# Patient Record
Sex: FEMALE | Race: WHITE | Employment: OTHER | URBAN - METROPOLITAN AREA
[De-identification: names, ages, dates, MRNs, and addresses within clinical notes are randomized per-mention and may not be internally consistent; named-entity substitution may affect disease eponyms.]

---

## 2017-03-06 ENCOUNTER — OFFICE VISIT (OUTPATIENT)
Dept: ORTHOPEDIC SURGERY | Age: 62
End: 2017-03-06

## 2017-03-06 ENCOUNTER — TELEPHONE (OUTPATIENT)
Dept: ORTHOPEDIC SURGERY | Age: 62
End: 2017-03-06

## 2017-03-06 VITALS
SYSTOLIC BLOOD PRESSURE: 120 MMHG | HEART RATE: 88 BPM | BODY MASS INDEX: 29.03 KG/M2 | WEIGHT: 184.97 LBS | DIASTOLIC BLOOD PRESSURE: 84 MMHG | HEIGHT: 67 IN

## 2017-03-06 DIAGNOSIS — M25.561 CHRONIC PAIN OF BOTH KNEES: ICD-10-CM

## 2017-03-06 DIAGNOSIS — G89.29 CHRONIC PAIN OF BOTH KNEES: ICD-10-CM

## 2017-03-06 DIAGNOSIS — M22.41 CHONDROMALACIA OF BOTH PATELLAE: ICD-10-CM

## 2017-03-06 DIAGNOSIS — M25.562 CHRONIC PAIN OF BOTH KNEES: ICD-10-CM

## 2017-03-06 DIAGNOSIS — M17.0 PRIMARY OSTEOARTHRITIS OF BOTH KNEES: Primary | ICD-10-CM

## 2017-03-06 DIAGNOSIS — M22.42 CHONDROMALACIA OF BOTH PATELLAE: ICD-10-CM

## 2017-03-06 PROCEDURE — 99214 OFFICE O/P EST MOD 30 MIN: CPT | Performed by: FAMILY MEDICINE

## 2017-03-06 RX ORDER — FLUOXETINE HYDROCHLORIDE 40 MG/1
CAPSULE ORAL
Refills: 0 | COMMUNITY
Start: 2017-02-21

## 2017-03-07 ENCOUNTER — TELEPHONE (OUTPATIENT)
Dept: ORTHOPEDIC SURGERY | Age: 62
End: 2017-03-07

## 2017-04-17 ENCOUNTER — OFFICE VISIT (OUTPATIENT)
Dept: ORTHOPEDIC SURGERY | Age: 62
End: 2017-04-17

## 2017-04-17 VITALS
HEIGHT: 67 IN | SYSTOLIC BLOOD PRESSURE: 139 MMHG | DIASTOLIC BLOOD PRESSURE: 90 MMHG | BODY MASS INDEX: 29.03 KG/M2 | HEART RATE: 78 BPM | WEIGHT: 184.97 LBS

## 2017-04-17 DIAGNOSIS — M17.11 PATELLOFEMORAL ARTHRITIS OF RIGHT KNEE: Primary | Chronic | ICD-10-CM

## 2017-04-17 DIAGNOSIS — M17.11 PRIMARY OSTEOARTHRITIS OF RIGHT KNEE: Chronic | ICD-10-CM

## 2017-04-17 PROCEDURE — 20610 DRAIN/INJ JOINT/BURSA W/O US: CPT | Performed by: ORTHOPAEDIC SURGERY

## 2017-04-24 ENCOUNTER — OFFICE VISIT (OUTPATIENT)
Dept: ORTHOPEDIC SURGERY | Age: 62
End: 2017-04-24

## 2017-04-24 DIAGNOSIS — M17.11 PRIMARY OSTEOARTHRITIS OF RIGHT KNEE: Primary | Chronic | ICD-10-CM

## 2017-04-24 DIAGNOSIS — M17.12 PRIMARY OSTEOARTHRITIS OF LEFT KNEE: ICD-10-CM

## 2017-04-24 DIAGNOSIS — M17.11 PATELLOFEMORAL ARTHRITIS OF RIGHT KNEE: Chronic | ICD-10-CM

## 2017-04-24 PROBLEM — M17.0 PRIMARY OSTEOARTHRITIS OF BOTH KNEES: Chronic | Status: ACTIVE | Noted: 2017-03-06

## 2017-04-24 PROCEDURE — 20610 DRAIN/INJ JOINT/BURSA W/O US: CPT | Performed by: ORTHOPAEDIC SURGERY

## 2017-05-08 ENCOUNTER — OFFICE VISIT (OUTPATIENT)
Dept: ORTHOPEDIC SURGERY | Age: 62
End: 2017-05-08

## 2017-05-08 DIAGNOSIS — M17.12 PRIMARY OSTEOARTHRITIS OF LEFT KNEE: ICD-10-CM

## 2017-05-08 DIAGNOSIS — M17.11 PRIMARY OSTEOARTHRITIS OF RIGHT KNEE: Primary | Chronic | ICD-10-CM

## 2017-05-08 PROCEDURE — 20610 DRAIN/INJ JOINT/BURSA W/O US: CPT | Performed by: ORTHOPAEDIC SURGERY

## 2017-05-08 RX ORDER — MELOXICAM 15 MG/1
15 TABLET ORAL DAILY
Qty: 30 TABLET | Refills: 3 | Status: SHIPPED | OUTPATIENT
Start: 2017-05-08

## 2017-08-22 ENCOUNTER — TELEPHONE (OUTPATIENT)
Dept: ORTHOPEDIC SURGERY | Age: 62
End: 2017-08-22

## 2017-08-23 ENCOUNTER — TELEPHONE (OUTPATIENT)
Dept: ORTHOPEDIC SURGERY | Age: 62
End: 2017-08-23

## 2017-11-03 ENCOUNTER — TELEPHONE (OUTPATIENT)
Dept: ORTHOPEDIC SURGERY | Age: 62
End: 2017-11-03

## 2017-11-03 NOTE — TELEPHONE ENCOUNTER
The doctor had to cancel clinic on Nov 17 so she needs to reschedule. I left a message on her machine stating this and to call the office to reschedule.   [phone]

## 2017-11-20 ENCOUNTER — OFFICE VISIT (OUTPATIENT)
Dept: ORTHOPEDIC SURGERY | Age: 62
End: 2017-11-20

## 2017-11-20 DIAGNOSIS — M17.11 PRIMARY OSTEOARTHRITIS OF RIGHT KNEE: Primary | Chronic | ICD-10-CM

## 2017-11-20 DIAGNOSIS — M17.12 PRIMARY OSTEOARTHRITIS OF LEFT KNEE: ICD-10-CM

## 2017-11-20 PROCEDURE — 20610 DRAIN/INJ JOINT/BURSA W/O US: CPT | Performed by: ORTHOPAEDIC SURGERY

## 2017-11-27 ENCOUNTER — OFFICE VISIT (OUTPATIENT)
Dept: ORTHOPEDIC SURGERY | Age: 62
End: 2017-11-27

## 2017-11-27 DIAGNOSIS — M17.12 PRIMARY OSTEOARTHRITIS OF LEFT KNEE: Primary | ICD-10-CM

## 2017-11-27 DIAGNOSIS — M17.11 PRIMARY OSTEOARTHRITIS OF RIGHT KNEE: Chronic | ICD-10-CM

## 2017-11-27 PROCEDURE — 20610 DRAIN/INJ JOINT/BURSA W/O US: CPT | Performed by: ORTHOPAEDIC SURGERY

## 2017-12-04 ENCOUNTER — OFFICE VISIT (OUTPATIENT)
Dept: ORTHOPEDIC SURGERY | Age: 62
End: 2017-12-04

## 2017-12-04 DIAGNOSIS — M17.11 PRIMARY OSTEOARTHRITIS OF RIGHT KNEE: Chronic | ICD-10-CM

## 2017-12-04 DIAGNOSIS — M17.12 PRIMARY OSTEOARTHRITIS OF LEFT KNEE: Primary | ICD-10-CM

## 2017-12-04 PROCEDURE — 20610 DRAIN/INJ JOINT/BURSA W/O US: CPT | Performed by: ORTHOPAEDIC SURGERY

## 2017-12-04 NOTE — PROGRESS NOTES
The patient returns today for their third Euflexxa injection to the left and right knees for diagnosis of osteoarthritis. . The risks, benefits, and complications of the injections were again discussed in detail with the patient. The risks discussed included but are not limited to infection, skin reactions, hot swollen joints, and anaphylaxis. The patient gave verbal informed consent for the injection. The patient skin was prepped with iodine and sterile 4x4 gauze pad and the left and right knee joints were injected with 2 ml of Euflexxa intra-articularly under sterile conditions  into the supra-lateral pouch. The patient tolerated the injection reasonably well. The patient was given instructions to ice the left and right knee and avoid strenuous activities for 24-48 hours. The patient was instructed to call the office immediately if there is increased pain, redness, warmth, fever, or chills. We will see the patient back in 3 months or as needed for follow up.

## 2018-06-26 ENCOUNTER — TELEPHONE (OUTPATIENT)
Dept: ORTHOPEDIC SURGERY | Age: 63
End: 2018-06-26

## 2018-06-26 DIAGNOSIS — M17.11 PATELLOFEMORAL ARTHRITIS OF RIGHT KNEE: Primary | Chronic | ICD-10-CM

## 2018-06-26 DIAGNOSIS — M17.11 PRIMARY OSTEOARTHRITIS OF RIGHT KNEE: Chronic | ICD-10-CM

## 2018-07-09 ENCOUNTER — TELEPHONE (OUTPATIENT)
Dept: ORTHOPEDIC SURGERY | Age: 63
End: 2018-07-09

## 2018-07-13 ENCOUNTER — OFFICE VISIT (OUTPATIENT)
Dept: ORTHOPEDIC SURGERY | Age: 63
End: 2018-07-13

## 2018-07-13 DIAGNOSIS — M17.12 PRIMARY OSTEOARTHRITIS OF LEFT KNEE: ICD-10-CM

## 2018-07-13 DIAGNOSIS — M17.11 PRIMARY OSTEOARTHRITIS OF RIGHT KNEE: Primary | Chronic | ICD-10-CM

## 2018-07-13 PROCEDURE — 20610 DRAIN/INJ JOINT/BURSA W/O US: CPT | Performed by: ORTHOPAEDIC SURGERY

## 2018-07-13 NOTE — PROGRESS NOTES
She continues with left and right knee primary osteoarthritis which is active and affecting activities of daily living. She has been refused to get viscous supplementation injections by her primary insurance company. I am deathly recommending these series of injections with Euflexxa injections in both left and right knee. Patient has unfortunately other be having to take care of this on her own which is unfortunate. The patient returns today for their first Euflexxa injection to the left and right knees for diagnosis of osteoarthritis. . The risks, benefits, and complications of the injections were again discussed in detail with the patient. The risks discussed included but are not limited to infection, skin reactions, hot swollen joints, and anaphylaxis. The patient gave verbal informed consent for the injection. The patient skin was prepped with iodine and sterile 4x4 gauze pad and the left and right knee joints were injected with 2 ml of Euflexxa intra-articularly under sterile conditions  into the supra-lateral pouch. The patient tolerated the injection reasonably well. The patient was given instructions to ice the left and right knee and avoid strenuous activities for 24-48 hours. The patient was instructed to call the office immediately if there is increased pain, redness, warmth, fever, or chills. We will see the patient back in [one week] for their second injections.

## 2018-08-06 ENCOUNTER — OFFICE VISIT (OUTPATIENT)
Dept: ORTHOPEDIC SURGERY | Age: 63
End: 2018-08-06

## 2018-08-06 DIAGNOSIS — M17.0 PRIMARY OSTEOARTHRITIS OF BOTH KNEES: Chronic | ICD-10-CM

## 2018-08-06 DIAGNOSIS — M22.42 CHONDROMALACIA PATELLAE OF LEFT KNEE: ICD-10-CM

## 2018-08-06 DIAGNOSIS — M17.11 PRIMARY OSTEOARTHRITIS OF RIGHT KNEE: Chronic | ICD-10-CM

## 2018-08-06 DIAGNOSIS — M17.11 PATELLOFEMORAL ARTHRITIS OF RIGHT KNEE: Primary | Chronic | ICD-10-CM

## 2018-08-06 DIAGNOSIS — M17.12 PRIMARY OSTEOARTHRITIS OF LEFT KNEE: ICD-10-CM

## 2018-08-06 PROCEDURE — 20610 DRAIN/INJ JOINT/BURSA W/O US: CPT | Performed by: ORTHOPAEDIC SURGERY

## 2018-08-13 ENCOUNTER — OFFICE VISIT (OUTPATIENT)
Dept: ORTHOPEDIC SURGERY | Age: 63
End: 2018-08-13

## 2018-08-13 VITALS
BODY MASS INDEX: 27.64 KG/M2 | DIASTOLIC BLOOD PRESSURE: 89 MMHG | WEIGHT: 172 LBS | HEART RATE: 80 BPM | SYSTOLIC BLOOD PRESSURE: 138 MMHG | HEIGHT: 66 IN

## 2018-08-13 DIAGNOSIS — M22.42 CHONDROMALACIA OF BOTH PATELLAE: ICD-10-CM

## 2018-08-13 DIAGNOSIS — M25.562 CHRONIC PAIN OF BOTH KNEES: ICD-10-CM

## 2018-08-13 DIAGNOSIS — G89.29 CHRONIC PAIN OF BOTH KNEES: ICD-10-CM

## 2018-08-13 DIAGNOSIS — M25.561 CHRONIC PAIN OF BOTH KNEES: ICD-10-CM

## 2018-08-13 DIAGNOSIS — M17.0 PRIMARY OSTEOARTHRITIS OF BOTH KNEES: Primary | Chronic | ICD-10-CM

## 2018-08-13 DIAGNOSIS — M22.41 CHONDROMALACIA OF BOTH PATELLAE: ICD-10-CM

## 2018-08-13 PROCEDURE — 20610 DRAIN/INJ JOINT/BURSA W/O US: CPT | Performed by: FAMILY MEDICINE

## 2018-08-13 PROCEDURE — 99999 PR OFFICE/OUTPT VISIT,PROCEDURE ONLY: CPT | Performed by: FAMILY MEDICINE

## 2018-08-13 NOTE — PROGRESS NOTES
The patient returns today for their 3rd Euflexxa injection to the left and right knees for diagnosis of osteoarthritis. . The risks, benefits, and complications of the injections were again discussed in detail with the patient. The risks discussed included but are not limited to infection, skin reactions, hot swollen joints, and anaphylaxis. The patient gave verbal informed consent for the injection. The patient skin was prepped with iodine and sterile 4x4 gauze pad and the left and right knee joints were injected with 2 ml of Euflexxa intra-articularly under sterile conditions  into the supra-lateral pouch. The patient tolerated the injection reasonably well. The patient was given instructions to ice the left and right knee and avoid strenuous activities for 24-48 hours. The patient was instructed to call the office immediately if there is increased pain, redness, warmth, fever, or chills. She is worried that she can have repeat viscous supplementation in 6 months if necessary. She will continue with her over-the-counter Advil and continue with her back care with Dr. Kellen Neri. She will call with questions or concerns.

## 2018-09-12 ENCOUNTER — TELEPHONE (OUTPATIENT)
Dept: ORTHOPEDIC SURGERY | Age: 63
End: 2018-09-12

## 2018-09-17 ENCOUNTER — OFFICE VISIT (OUTPATIENT)
Dept: ORTHOPEDIC SURGERY | Age: 63
End: 2018-09-17

## 2018-09-17 VITALS — WEIGHT: 175 LBS | BODY MASS INDEX: 28.12 KG/M2 | HEIGHT: 66 IN

## 2018-09-17 DIAGNOSIS — M25.562 LEFT KNEE PAIN, UNSPECIFIED CHRONICITY: ICD-10-CM

## 2018-09-17 DIAGNOSIS — M25.561 RIGHT KNEE PAIN, UNSPECIFIED CHRONICITY: Primary | ICD-10-CM

## 2018-09-17 DIAGNOSIS — M17.0 PRIMARY OSTEOARTHRITIS OF BOTH KNEES: Chronic | ICD-10-CM

## 2018-10-19 ENCOUNTER — OFFICE VISIT (OUTPATIENT)
Dept: ORTHOPEDIC SURGERY | Age: 63
End: 2018-10-19
Payer: COMMERCIAL

## 2018-10-19 VITALS
HEIGHT: 66 IN | HEART RATE: 76 BPM | WEIGHT: 175.04 LBS | SYSTOLIC BLOOD PRESSURE: 140 MMHG | DIASTOLIC BLOOD PRESSURE: 88 MMHG | BODY MASS INDEX: 28.13 KG/M2

## 2018-10-19 DIAGNOSIS — M79.671 RIGHT FOOT PAIN: Primary | ICD-10-CM

## 2018-10-19 DIAGNOSIS — M21.611 BUNION OF RIGHT FOOT: ICD-10-CM

## 2018-10-19 PROCEDURE — 99213 OFFICE O/P EST LOW 20 MIN: CPT | Performed by: ORTHOPAEDIC SURGERY

## 2018-10-19 NOTE — PROGRESS NOTES
Chief Complaint    No chief complaint on file. History of Present Illness:  Deion Yao is a 60 y.o. female who is well-known to our practice here for evaluation chief complaint of right foot bunion it's painful. She states her family members had bunions in the past and she had a left foot bunion correction years ago. She's happy with the result on that side although she doesn't like the scar. She states this bunion is painful in his shoe and out of the shoe. It's gotten worse recently and that's what brought her in. She is also seeing Dr. King Justice for spondylolisthesis and is planning on having a surgical procedure done in the near future    Medical History:  Patient's medications, allergies, past medical, surgical, social and family histories were reviewed and updated as appropriate. Review of Systems:  Pertinent items are noted in HPI  Review of systems reviewed from Patient History Form dated on 10/19/18 and available in the patient's chart under the Media tab. Vital Signs: There were no vitals taken for this visit. General Exam:   Constitutional: Patient is adequately groomed with no evidence of malnutrition  DTRs: Deep tendon reflexes are intact  Mental Status: The patient is oriented to time, place and person. The patient's mood and affect are appropriate. Lymphatic: The lymphatic examination bilaterally reveals all areas to be without enlargement or induration. Foot Examination:    Inspection:  No significant swelling erythema or ecchymosis. She does not have a planus foot does have pronation of the great toe and a mild to moderate    Palpation:  Tenderness of the 1st MTP joint with plantar dorsal block    Range of Motion:  80° of right 1st MTP extension no gross hypermobility through the 1st TMT joint    Strength: Within normal limits    Special Tests:  No hypermobility through the 1st TMT joint    Skin: There are no rashes, ulcerations or lesions.     Gait: Mildly

## 2018-10-23 ENCOUNTER — TELEPHONE (OUTPATIENT)
Dept: ORTHOPEDIC SURGERY | Age: 63
End: 2018-10-23

## 2018-10-30 ENCOUNTER — OFFICE VISIT (OUTPATIENT)
Dept: ORTHOPEDIC SURGERY | Age: 63
End: 2018-10-30
Payer: COMMERCIAL

## 2018-10-30 VITALS
WEIGHT: 175.04 LBS | DIASTOLIC BLOOD PRESSURE: 86 MMHG | BODY MASS INDEX: 28.13 KG/M2 | HEIGHT: 66 IN | HEART RATE: 78 BPM | SYSTOLIC BLOOD PRESSURE: 131 MMHG

## 2018-10-30 DIAGNOSIS — M21.619 BUNION: ICD-10-CM

## 2018-10-30 DIAGNOSIS — M79.671 RIGHT FOOT PAIN: Primary | ICD-10-CM

## 2018-10-30 PROCEDURE — 99212 OFFICE O/P EST SF 10 MIN: CPT | Performed by: ORTHOPAEDIC SURGERY

## 2018-10-30 PROCEDURE — 20605 DRAIN/INJ JOINT/BURSA W/O US: CPT | Performed by: ORTHOPAEDIC SURGERY

## 2019-01-08 ENCOUNTER — OFFICE VISIT (OUTPATIENT)
Dept: ORTHOPEDIC SURGERY | Age: 64
End: 2019-01-08
Payer: COMMERCIAL

## 2019-01-08 VITALS — WEIGHT: 175.04 LBS | BODY MASS INDEX: 28.13 KG/M2 | HEIGHT: 66 IN

## 2019-01-08 DIAGNOSIS — M79.671 RIGHT FOOT PAIN: Primary | ICD-10-CM

## 2019-01-08 PROCEDURE — 99212 OFFICE O/P EST SF 10 MIN: CPT | Performed by: ORTHOPAEDIC SURGERY

## 2019-01-18 ENCOUNTER — TELEPHONE (OUTPATIENT)
Dept: ORTHOPEDIC SURGERY | Age: 64
End: 2019-01-18

## 2019-01-18 DIAGNOSIS — M22.42 CHONDROMALACIA PATELLAE OF LEFT KNEE: ICD-10-CM

## 2019-01-18 DIAGNOSIS — M22.41 CHONDROMALACIA OF BOTH PATELLAE: ICD-10-CM

## 2019-01-18 DIAGNOSIS — M22.42 CHONDROMALACIA OF BOTH PATELLAE: ICD-10-CM

## 2019-01-18 DIAGNOSIS — M17.11 PATELLOFEMORAL ARTHRITIS OF RIGHT KNEE: Primary | Chronic | ICD-10-CM

## 2019-01-18 DIAGNOSIS — M17.11 PRIMARY OSTEOARTHRITIS OF RIGHT KNEE: Chronic | ICD-10-CM

## 2019-01-18 DIAGNOSIS — M17.12 PRIMARY OSTEOARTHRITIS OF LEFT KNEE: ICD-10-CM

## 2019-02-25 ENCOUNTER — OFFICE VISIT (OUTPATIENT)
Dept: ORTHOPEDIC SURGERY | Age: 64
End: 2019-02-25
Payer: COMMERCIAL

## 2019-02-25 DIAGNOSIS — M17.0 PRIMARY OSTEOARTHRITIS OF BOTH KNEES: Primary | Chronic | ICD-10-CM

## 2019-02-25 PROCEDURE — 20610 DRAIN/INJ JOINT/BURSA W/O US: CPT | Performed by: ORTHOPAEDIC SURGERY

## 2019-03-04 ENCOUNTER — OFFICE VISIT (OUTPATIENT)
Dept: ORTHOPEDIC SURGERY | Age: 64
End: 2019-03-04
Payer: COMMERCIAL

## 2019-03-04 VITALS — HEIGHT: 66 IN | WEIGHT: 175.04 LBS | BODY MASS INDEX: 28.13 KG/M2

## 2019-03-04 DIAGNOSIS — M17.0 PRIMARY OSTEOARTHRITIS OF BOTH KNEES: Primary | ICD-10-CM

## 2019-03-04 PROCEDURE — 20610 DRAIN/INJ JOINT/BURSA W/O US: CPT | Performed by: ORTHOPAEDIC SURGERY

## 2019-03-11 ENCOUNTER — OFFICE VISIT (OUTPATIENT)
Dept: ORTHOPEDIC SURGERY | Age: 64
End: 2019-03-11
Payer: COMMERCIAL

## 2019-03-11 DIAGNOSIS — M17.0 PRIMARY OSTEOARTHRITIS OF BOTH KNEES: Primary | ICD-10-CM

## 2019-03-11 PROCEDURE — 20610 DRAIN/INJ JOINT/BURSA W/O US: CPT | Performed by: ORTHOPAEDIC SURGERY

## 2020-10-26 ENCOUNTER — OFFICE VISIT (OUTPATIENT)
Dept: ORTHOPEDIC SURGERY | Age: 65
End: 2020-10-26
Payer: MEDICARE

## 2020-10-26 VITALS — HEIGHT: 66 IN | BODY MASS INDEX: 28.12 KG/M2 | WEIGHT: 175 LBS

## 2020-10-26 PROCEDURE — 99213 OFFICE O/P EST LOW 20 MIN: CPT | Performed by: ORTHOPAEDIC SURGERY

## 2020-10-26 PROCEDURE — G8417 CALC BMI ABV UP PARAM F/U: HCPCS | Performed by: ORTHOPAEDIC SURGERY

## 2020-10-26 PROCEDURE — 1123F ACP DISCUSS/DSCN MKR DOCD: CPT | Performed by: ORTHOPAEDIC SURGERY

## 2020-10-26 PROCEDURE — 20611 DRAIN/INJ JOINT/BURSA W/US: CPT | Performed by: ORTHOPAEDIC SURGERY

## 2020-10-26 PROCEDURE — G8484 FLU IMMUNIZE NO ADMIN: HCPCS | Performed by: ORTHOPAEDIC SURGERY

## 2020-10-26 PROCEDURE — G8400 PT W/DXA NO RESULTS DOC: HCPCS | Performed by: ORTHOPAEDIC SURGERY

## 2020-10-26 PROCEDURE — G8427 DOCREV CUR MEDS BY ELIG CLIN: HCPCS | Performed by: ORTHOPAEDIC SURGERY

## 2020-10-26 PROCEDURE — 1090F PRES/ABSN URINE INCON ASSESS: CPT | Performed by: ORTHOPAEDIC SURGERY

## 2020-10-26 PROCEDURE — 4040F PNEUMOC VAC/ADMIN/RCVD: CPT | Performed by: ORTHOPAEDIC SURGERY

## 2020-10-26 PROCEDURE — 3017F COLORECTAL CA SCREEN DOC REV: CPT | Performed by: ORTHOPAEDIC SURGERY

## 2020-10-26 PROCEDURE — 1036F TOBACCO NON-USER: CPT | Performed by: ORTHOPAEDIC SURGERY

## 2020-10-26 RX ORDER — HYALURONATE SODIUM 10 MG/ML
40 SYRINGE (ML) INTRAARTICULAR ONCE
Status: COMPLETED | OUTPATIENT
Start: 2020-10-26 | End: 2020-10-26

## 2020-10-26 RX ADMIN — Medication 40 MG: at 09:53

## 2020-11-02 ENCOUNTER — NURSE ONLY (OUTPATIENT)
Dept: ORTHOPEDIC SURGERY | Age: 65
End: 2020-11-02
Payer: MEDICARE

## 2020-11-02 PROCEDURE — 20611 DRAIN/INJ JOINT/BURSA W/US: CPT | Performed by: PHYSICIAN ASSISTANT

## 2020-11-02 RX ORDER — HYALURONATE SODIUM 10 MG/ML
40 SYRINGE (ML) INTRAARTICULAR ONCE
Status: COMPLETED | OUTPATIENT
Start: 2020-11-02 | End: 2020-11-02

## 2020-11-02 RX ADMIN — Medication 40 MG: at 16:30

## 2020-11-02 NOTE — PROGRESS NOTES
Euflexxa #2 bilateral knee  Osteoarthritis of the bilateral knees    The patient returns today for their second right and LEFT knee Visco supplementation injection. The risks, benefits, and complications of the injections were again discussed in detail with the patient. The risks discussed included but are not limited to infection, skin reactions, hot swollen joints, and anaphylaxis. The patient gave verbal informed consent for the injection. The patient skin was prepped with 3 sterile gauze pads soaked with alcohol solution and the knee joint was injected with 2cc Euflexxa right and LEFT intra-articularly under sterile conditions . Technique: Under sterile conditions a SonXoopit ultrasound unit with a variable frequency (6.0-15.0 MHz) linear transducer was used to localize the placement of a 22-gauge needle into the Right and Left  knee joint. Findings: Successful needle placement for intra-articular Visco supplementation injection. Final images were taken and saved for permanent record. The patient tolerated the injection reasonably well. The patient was given instructions to ice the the knee and avoid strenuous activities for 24-48 hours. The patient was instructed to call the office immediately if there is increased pain, redness, warmth, fever, or chills. We will see the patient back in one week for the third injection.

## 2020-11-09 ENCOUNTER — NURSE ONLY (OUTPATIENT)
Dept: ORTHOPEDIC SURGERY | Age: 65
End: 2020-11-09
Payer: MEDICARE

## 2020-11-09 PROCEDURE — 20611 DRAIN/INJ JOINT/BURSA W/US: CPT | Performed by: PHYSICIAN ASSISTANT

## 2020-11-09 RX ORDER — HYALURONATE SODIUM 10 MG/ML
40 SYRINGE (ML) INTRAARTICULAR ONCE
Status: COMPLETED | OUTPATIENT
Start: 2020-11-09 | End: 2020-11-09

## 2020-11-09 RX ADMIN — Medication 40 MG: at 09:12

## 2021-05-10 ENCOUNTER — HOSPITAL ENCOUNTER (OUTPATIENT)
Dept: OCCUPATIONAL THERAPY | Age: 66
Setting detail: THERAPIES SERIES
Discharge: HOME | End: 2021-05-10
Payer: MEDICARE

## 2021-05-10 PROCEDURE — 97112 NEUROMUSCULAR REEDUCATION: CPT | Performed by: OCCUPATIONAL THERAPIST

## 2021-05-10 PROCEDURE — L3906 WHO W/O JOINTS CF: HCPCS | Performed by: OCCUPATIONAL THERAPIST

## 2021-05-10 NOTE — PLAN OF CARE
previously for this diagnosis. Pain: 89-10    Objective Findings as appropriate:  ROM, strength, edema, wound/ scar appearance, function:    Type of splint:   Splint protocol utilization: To wear splint at all times except for cleansing and gentle AROM  Splint Purpose: [x]Immobilize or protect [x]Promote healing of  fx   [x]Relieve pain  []Provide support for improved hand function []Maximize joint motion    Treatment:   [x]Splint provided ([x]Customized/ []Prefabricated), and splint rationale explained. [x]Patient instructed in [x]wear/ [x]care of splint and educated regarding diagnosis. []Patient instructed in symptom reduction techniques   [x]HEP instruction:  Wrist and finger gentle AROM    []Discussed ADL assistive device     Written Information Distributed: []HEP  [x]Splint care and wearing protocol    Patient response to evaluation and instructions:  [x]Attentive/interested   [x]Asked questions/ retained info  []Appeared disinterested  []Poor retention of information  []Appeared anxious/ fearful    Assessment and Plan:  Goals: [x]Patient will be able to verbalize rationale for, and demonstrate proper wearing     of splint. [x]Splint will provide proper fit and function. []Patient will be able to verbalize 2-3 ways to prevent further symptoms. [x]Patient will be able to don and doff independently. []Patient will be independent with HEP    Goals met:  [x]yes []no    Plan:  [x]Splint completed with good fit and function. Hand Therapy to follow up for     splint modifications as needed    []Splint completed; OT/PT evaluation initiated. Patient to return for further     treatment.     Vicki Lovelace, OT/L [id number]

## 2021-05-19 ENCOUNTER — HOSPITAL ENCOUNTER (OUTPATIENT)
Dept: OCCUPATIONAL THERAPY | Age: 66
Setting detail: THERAPIES SERIES
Discharge: HOME | End: 2021-05-19
Payer: MEDICARE

## 2021-05-19 PROCEDURE — 97140 MANUAL THERAPY 1/> REGIONS: CPT | Performed by: OCCUPATIONAL THERAPIST

## 2021-05-19 PROCEDURE — 97165 OT EVAL LOW COMPLEX 30 MIN: CPT | Performed by: OCCUPATIONAL THERAPIST

## 2021-05-19 PROCEDURE — 97112 NEUROMUSCULAR REEDUCATION: CPT | Performed by: OCCUPATIONAL THERAPIST

## 2021-05-19 NOTE — FLOWSHEET NOTE
[facility],  [address], [address] [place] [address]    Occupational Therapy Treatment Note/ Progress Report:     Is this a Progress Report:     []  Yes  [x]  No      If Yes:  Date Range for reporting period:  Beginning 21    Ending    Progress report will be due (10 Rx or 30 days whichever is TDBP):    Recertification will be due (POC Duration  / 90 days whichever is less):21  Patient: Kar Nguyen                                    : 1955                                  MRN: [id number]  Referring Physician:  Jamal Ortiz                                            Evaluation Date: 2021                                           Medical Diagnosis Information:  Diagnosis: R distal radius fx  X43.764A    Treatment Dx: Megan Coffmans wrist painM25. 684                                Date of Injury: 21  Date of Surgery: 21  R wrist ORIF                                Insurance information:   /[facility]     Date of Patient follow up with Physician:4 wksHas the plan of care been signed (Y/N):        []  Yes  [x]  No       Visit # Insurance Allowable Auth Required   2 ? []  Yes []  No    From 21  to 2021      Preferred Language for Healthcare:   [x]English       []other:     RESTRICTIONS/PRECAUTIONS: Pt has cast L wrist. R wrist to follow distal radius fx protocol for ORIF     Latex Allergy:  []? No      []? Yes                    Pacemaker:  []? No       []? Yes         Functional Scale: 98% (Quick DASH)                                 Date assessed:  2021     SUBJECTIVE: Pt reports she has been working hard to loosen up R wrist at home   Patient reported deficits/history of current problem: Pt tripped over block in sidewalk     Pain Scale:410            [x]? Constant                []?Intermittent              []?other:  Pain Location:  wrist  Easing factors:ice  Provocative factors: AROM      OBJECTIVE: [place] [address]) Provided verbal/tactile cueing for activities related to improving balance, coordination, kinesthetic sense, posture, motor skill, proprioception and motor activation to allow for proper function of scapular, scapulothoracic and UE control with self care, carrying, lifting, driving/computer work    Home Exercise Program:    [] (69353) Reviewed/Progressed HEP activities related to strengthening, flexibility, endurance, ROM of scapular, scapulothoracic and UE control with self care, reaching, carrying, lifting, house/yardwork, driving/computer work  [] (67038) Reviewed/Progressed HEP activities related to improving balance, coordination, kinesthetic sense, posture, motor skill, proprioception of scapular, scapulothoracic and UE control with self care, reaching, carrying, lifting, house/yardwork, driving/computer work      Manual Treatments:  PROM / STM / Oscillations-Mobs:  G-I, II, III, IV (PA's, Inf., Post.)  [] (15343) Provided manual therapy to mobilize soft tissue/joints of cervical/CT, scapular GHJ and UE for the purpose of modulating pain, promoting relaxation,  increasing ROM, reducing/eliminating soft tissue swelling/inflammation/restriction, improving soft tissue extensibility and allowing for proper ROM for normal function with self care, reaching, carrying, lifting, house/yardwork, driving/computer work    ADL Training:  [] (95855) Provided self-care/home management training related to activities of daily living and compensatory training, and/or use of adaptive equipment      Charges:  Timed Code Treatment Minutes: 25'   Total Treatment Minutes: 39'   Worker's Comp: Time In/Time Out     [x] EVAL (LOW) 44330 (typically 20 minutes face-to-face)    [] EVAL (MOD) 70797 (typically 30 minutes face-to-face)  [] EVAL (HIGH) 18448 (typically 45 minutes face-to-face)  [] OT Re-eval (45858)       [] Alona [phone] x      [] VEZSO(32559)  [x] NMR (70297) x  1    [] Estim (attended) (31403)   [x] Manual ([id number]) x 1   [] US (97982)  [] TA (39691) x      [] Paraffin (19087)  [] ADL  (50087) x     [] Splint/L code:    [] Estim (unattended) (27691)  [] Fluidotherapy (03965)  [] Other:    Comorbidities Affecting Functional Performance:     []Anxiety (F41.9)/Depression (F32.9)   []Diabetes Type 1(E10.65) or 2 (E11.65)   []Rheumatoid Arthritis (M05.9)  []Fibromyalgia (M79.7)  []Neuropathy(G60.9)  []Osteoarthritis(M19.91)  []None   []Other:    ASSESSMENT:    GOALS:  Patient stated goal: To have full function of both UEs   []? Progressing: []? Met: []? Not Met: []? Adjusted     Therapist goals for Patient:   Short Term Goals: To be achieved in: 2 weeks  1. Independent in HEP and progression per patient tolerance, in order to prevent re-injury. []? Progressing: []? Met: []? Not Met: []? Adjusted   2. Patient will have a decrease in pain to facilitate improvement in movement, function, and ADLs as indicated by Functional Deficits. []? Progressing: []? Met: []? Not Met: []? Adjusted     Long Term Goals to be achieved in 12 weeks (through 8/19/21), including patient directed goals to address patient identified performance deficits:  1) Pt to be independent in graded HEP progression with a good level of effort and compliance. []? Progressing: []? Met: []? Not Met: []? Adjusted   2) Pt to report a score of </= 30 % on the Quick DASH disability questionnaire for increased performance with carrying, moving, and handling objects. []? Progressing: []? Met: []? Not Met: []? Adjusted   3) Pt will demonstrate increased ROM to [facility] for improved independence with self care, home mgt, leisure activities, driving and sleeping.  []? Progressing: []? Met: []? Not Met: []? Adjusted   4) Pt will demonstrate increased strength to at least 25# of  strength in both hands for improved independence with  self care, home mgt, leisure activities, driving and sleeping.  []? Progressing: []? Met: []? Not Met: []?  Adjusted   5) Pt will have a decrease in pain to 2/10 to facilitate  self care, home mgt, leisure activities, driving and sleeping.  []? Progressing: []? Met: []? Not Met: []? Adjusted    Overall Progression Towards Functional Goals/Treatment Progress Update:  [] Patient is progressing as expected towards functional goals listed. [] Progression is slowed due to complexities/impairments listed. [] Progression has been slowed due to co-morbidities. [x] Plan just implemented, too soon to assess goals progression <30 days  [] Goals require adjustment due to lack of progress  [] Patient is not progressing as expected and requires additional follow up with physician  [] All goals are met  [] Other:     Prognosis for POC: [x] Good [] Fair  [] Poor    Patient requires continued skilled intervention: [x] Yes  [] No    Treatment/Activity Tolerance:  [x] Patient able to complete treatment  [] Patient limited by fatigue  [] Patient limited by pain    [] Patient limited by other medical complications  [] Other:                  PLAN: See eval  [] Continue per plan of care [] Alter current plan (see comments above)  [x] Plan of care initiated [] Hold pending MD visit [] Discharge      Electronically signed by:  Nilam Mccallum OT/DONNA [id number]    Note: If patient does not return for scheduled/ recommended follow up visits, this note will serve as a discharge from care along with most recent update on progress.   Phone: [phone]  Fax [phone]

## 2021-05-19 NOTE — PLAN OF CARE
OBJECTIVE:   Date:  Hand Dominance:     []  Right    [x] Left 5/19/2021     Objective Measures:    PAIN 4/10   Quick DASH 98%   Digits tip to DPFC in cm WFL   Thumb ROM WFL   Thumb opposition  WFL   Thumb Radial/Palmar abd ROM R:  L:   Wrist ROM Ext/Flex R: 62/45  L:   Rad/Uln dev ROM R:  L:   Forearm ROM  Sup/pron R:75/90  L:   Elbow ROM Ext/flex WFL:   Shoulder Flex  Shoulder Abd  Shoulder IR/ER    Edema in cm circumf. MCPJs R:  L:   Edema in cm circumf.   Wrist R:  L:    strength in lbs NT   Pinch Strengthin lbs: lat  R:  L:   Pinch Strength in lbs:  3 point R:  L:     MMT:     Observations:  (including splints, bandages, incisions, scars): Steri strips still present R wrist    L wrist still in cast     Sensation:  [x] No reported deficits  [] Intact to light touch    [] [place] Soledad test completed, findings as noted:  [] Other:    Palpation: unremarkable    Occupational Profile:  Home Enviroment: lives with  [x] spouse,  [] family,  [] alone,  [] significant other,   [] other:    Occupation/School: retired    Recreational Activities/Meaningful Interests: walking,daily stretching; training dogs    Prior Level of Function: [x] Independent with ADLs/IADLs     [] Assistance needed (describe):    Patient-Identified Primary Performance Deficits (to be addressed in POC):   [x] bathing    [x] household tasks (cooking/cleaning)   [x] dressing    [] self feeding   [x] grooming    [x] work/education   [] functional mobility   [x] sleeping/rest   [] toileting/hygiene   [x] recreational activities   [x] driving    [] community/social participation   [] other:     Comorbidities Affecting Functional Performance:     [x]Anxiety (F41.9)/Depression (F32.9)   []Diabetes Type 1(E10.65) or 2 (E11.65)   []Rheumatoid Arthritis (M05.9)  []Fibromyalgia (M79.7)  []Neuropathy(G60.9)  []Osteoarthritis(M19.91)  []None   []Other:    Functional Mobility/Transfers/Gait:  [x] Independent - no significant gait deviations  [] Assistance needed   [] Assistive device used: Falls Risk Assessment (30 days):   [] Falls Risk assessed and no intervention required. [x] Falls Risk assessed and Patient requires intervention due to being higher risk  ( Pt reports balance issues. MD has written referral for PT balance eval/tx)  TUG score (>12s at risk):     [] Falls education provided, including      Review Of Systems (ROS): [x]Performed Review of systems (Integumentary, CardioPulmonary, Neurological) by intake and observation. Intake form has been scanned into medical record. Patient has been instructed to contact their primary care physician regarding ROS issues if not already being addressed at this time. ASSESSMENT:   This patient presents with signs and symptoms consistent with the medical diagnosis provided by the referring physician. Impairments (physical, cognitive and/or psychosocial):  [] Decreased mobility   [x] Weakness    [] Hypersensitivity   [x] Pain/tenderness   [] Edema/swelling   [] Decreased coordination (fine/gross motor)   [] Impaired body mechanics  [] Sensory loss  [] Loss of balance   [] Other:      Performance Deficits (to be addressed in plan of care):   [x] Bathing    [x] Household Tasks (cooking/cleaning)   [x] Dressing    [] Self Feeding   [x] Grooming    [x] Work/Education   [] Functional Mobility   [x] Sleeping/Rest   [] Toileting/Hygiene   [x] Recreational Activities   [x] Driving    [] Community/Social Participation   [] Other:     Rehab Potential:   [] Excellent [x] Good [] Fair  [] Poor     Barriers affecting rehab potential:  []Age    []Lack of Motivation   []Co-Morbidities  []Cognitive Function  []Environmental/home/work barriers  []Other:     Tolerance of evaluation/treatment:    [] Excellent [x] Good [] Fair  [] Poor    PLAN OF CARE:  Interventions:   [x] Therapeutic Exercise [x] Therapeutic Activity    [x] Activities of Daily Living [x] Neuromuscular Re-education      [x] Patient Education  [x] An occupational profile and medical/therapy history, which includes:   [x] a brief history including medical and/or therapy records relating to the     presenting problem   [] an expanded review of medical and/or therapy records and additional review     of physical, cognitive or psychosocial history related to current functional    performance   [] an extensive additional review of review of medical and/or therapy records   and physical, cognitive, or psychosocial history related to current    functional performance    [x] An assessment that identifies performance deficits (relating to physical, cognitive, or psychosocial skills) that result in activity limitations and/or participation restrictions:   [x] 1-3 performance deficits   [] 3-5 performance deficits   [] 5 or more performance deficits    [x] Clinical decision making of:   [x] low complexity, including analysis of occupational profile, data analysis from problem focused assessment, and consideration of a limited number of treatment options. No comorbidities affect occupational performance. No task modifications or assistance needed to complete evaluation. [] moderate complexity, including analysis of occupational profile, data analysis from detailed assessment and consideration of several treatment options. Comorbidities that affect occupational performance may be present. Minimal to moderate task modifications or assistance needed to complete assessment. [] high complexity, including analysis of occupational profile, analysis of data from comprehensive assessment and consideration of multiple treatment options. Multiple comorbidities present that affect occupational performance. Significant task modifications or assistance needed to complete assessment.     Evaluation Code:  [x] Low Complexity EVAL 64678 (typically 30 minutes face to face)  [] Mod Complexity EVAL 59360 (typically 45 minutes face to face)  [] High Complexity EVAL 57598 (typically 60 minutes face to face)    Electronically signed by:  Marisela Gonzalez, OT /L [id number]

## 2021-05-26 ENCOUNTER — HOSPITAL ENCOUNTER (OUTPATIENT)
Dept: OCCUPATIONAL THERAPY | Age: 66
Setting detail: THERAPIES SERIES
Discharge: HOME | End: 2021-05-26
Payer: MEDICARE

## 2021-05-26 PROCEDURE — 97110 THERAPEUTIC EXERCISES: CPT | Performed by: OCCUPATIONAL THERAPIST

## 2021-05-26 PROCEDURE — 97022 WHIRLPOOL THERAPY: CPT | Performed by: OCCUPATIONAL THERAPIST

## 2021-05-26 PROCEDURE — 97140 MANUAL THERAPY 1/> REGIONS: CPT | Performed by: OCCUPATIONAL THERAPIST

## 2021-05-26 PROCEDURE — 97112 NEUROMUSCULAR REEDUCATION: CPT | Performed by: OCCUPATIONAL THERAPIST

## 2021-05-26 NOTE — FLOWSHEET NOTE
[facility], [address]    Occupational Therapy Treatment Note/ Progress Report:     Is this a Progress Report:     []  Yes  [x]  No      If Yes:  Date Range for reporting period:  Beginning 21    Ending    Progress report will be due (10 Rx or 30 days whichever is CWOS):27    Recertification will be due (POC Duration  / 90 days whichever is less):21  Patient: Quynh Vivas                                    : 1955                                  MRN: [id number]  Referring Physician:  Jennifer Sánchez                                            Evaluation Date: 2021                                           Medical Diagnosis Information:  Diagnosis: R distal radius fx  U39.799Q    Treatment Dx: Tania Amanda wrist painM25. 717                                Date of Injury: 21  Date of Surgery: 21  R wrist ORIF                                [facility] information:   [facility]     Date of Patient follow up with Physician:4 wksHas the plan of care been signed (Y/N):        []  Yes  [x]  No       Visit # Insurance Allowable Auth Required   3 ? []  Yes []  No    From 21  to 2021      Preferred Language for Healthcare:   [x]English       []other:     RESTRICTIONS/PRECAUTIONS: Pt has cast L wrist. R wrist to follow distal radius fx protocol for ORIF     Latex Allergy:  []? No      []? Yes                    Pacemaker:  []? No       []? Yes         Functional Scale: 98% (Quick DASH)                                 Date assessed:  2021     SUBJECTIVE: Pt reports c/o increased soreness in wrist, likely from splint not fitting properly   Patient reported deficits/history of current problem: Pt tripped over block in sidewalk     Pain Scale:410            [x]? Constant                []?Intermittent              []?other:  Pain Location:  wrist  Easing factors:ice  Provocative factors: AROM      OBJECTIVE: At this time tx is on R wrist only  Date:  Hand Dominance:     []? Right    [x]? Left 5/19/2021      Objective Measures:     PAIN 4/10   Quick DASH 98%   Digits tip to DPFC in cm WFL   Thumb ROM WFL   Thumb opposition  WFL   Thumb Radial/Palmar abd ROM R:  L:   Wrist ROM Ext/Flex R: 62/45  L:   Rad/Uln dev ROM R:  L:   Forearm ROM  Sup/pron R:75/90  L:   Elbow ROM Ext/flex WFL:   Shoulder Flex  Shoulder Abd  Shoulder IR/ER     Edema in cm circumf. MCPJs R:  L:   Edema in cm circumf.   Wrist R:  L:    strength in lbs NT   Pinch Strengthin lbs: lat  R:  L:   Pinch Strength in lbs:  3 point R:  L:      MMT:      Observations:  (including splints, bandages, incisions, scars): Steri strips still present R wrist     L wrist still in cast             At this time tx is on R wrist only  MODALITIES: 5/19/21 5/26/21    Fluidotherapy (26832)  12'    Estim (30133/56443)      Paraffin (39429)      US (29430)      Iontophoresis (14773)      Hot Pack HP 10'     Cold Pack            INTERVENTIONS:      Pt educ HEP A/PROM Add to HEP: Scar massage; making paper wads          Power web  [employer] bearing on hand    putty  Yellow  Rolling, shaping                                                          Manual Therapy (80261) STM, gentle PROM STM, gentle PROM; scar massage    (IASTM, Dry Needling, manual mobilization)            Splinting      Lcode:      Orthotic Mgmt, Subsequent Enc (37740)      Orthotic Mgmt & Training (70966)            Other:              Therapeutic Exercise & NMR:  [] (89651) Provided verbal/tactile cueing for activities related to strengthening, flexibility, endurance, ROM  for improvements in scapular, scapulothoracic and UE control with self care, reaching, carrying, lifting, house/yardwork, driving/computer work.    [] (55779) Provided verbal/tactile cueing for activities related to improving balance, coordination, kinesthetic sense, posture, motor skill, proprioception  to assist with  scapular, scapulothoracic and UE control with self care, reaching, carrying, lifting, house/yardwork, driving/computer work.     Therapeutic Activities & NMR:    [] (31681 or 08184) Provided verbal/tactile cueing for activities related to improving balance, coordination, kinesthetic sense, posture, motor skill, proprioception and motor activation to allow for proper function of scapular, scapulothoracic and UE control with self care, carrying, lifting, driving/computer work    Home Exercise Program:    [] (50079) Reviewed/Progressed HEP activities related to strengthening, flexibility, endurance, ROM of scapular, scapulothoracic and UE control with self care, reaching, carrying, lifting, house/yardwork, driving/computer work  [] (81982) Reviewed/Progressed HEP activities related to improving balance, coordination, kinesthetic sense, posture, motor skill, proprioception of scapular, scapulothoracic and UE control with self care, reaching, carrying, lifting, house/yardwork, driving/computer work      Manual Treatments:  PROM / STM / Oscillations-Mobs:  G-I, II, III, IV (PA's, Inf., Post.)  [] (53522) Provided manual therapy to mobilize soft tissue/joints of cervical/CT, scapular GHJ and UE for the purpose of modulating pain, promoting relaxation,  increasing ROM, reducing/eliminating soft tissue swelling/inflammation/restriction, improving soft tissue extensibility and allowing for proper ROM for normal function with self care, reaching, carrying, lifting, house/yardwork, driving/computer work    ADL Training:  [] (36011) Provided self-care/home management training related to activities of daily living and compensatory training, and/or use of adaptive equipment      Charges:  Timed Code Treatment Minutes: 45'   Total Treatment Minutes: 48'   Worker's Comp: Time In/Time Out     [] EVAL (LOW) 97516 (typically 20 minutes face-to-face)    [] EVAL (MOD) 62594 (typically 30 minutes face-to-face)  [] EVAL (HIGH) 05085 (typically 45 minutes face-to-face)  [] OT Re-eval (62897)       [x] Alona [phone] x 1     [] TEOTZ(22352)  [x] NMR (38257) x  1    [] Estim (attended) (94882)   [x] Manual (59228 [facility]) x   1   [] US (05520)  [] TA (09739) x      [] Paraffin (79990)  [] ADL  (75739) x     [] Splint/L code:    [] Estim (unattended) (77282)  [x] Fluidotherapy (80553)  [] Other:    Comorbidities Affecting Functional Performance:     []Anxiety (F41.9)/Depression (F32.9)   []Diabetes Type 1(E10.65) or 2 (E11.65)   []Rheumatoid Arthritis (M05.9)  []Fibromyalgia (M79.7)  []Neuropathy(G60.9)  []Osteoarthritis(M19.91)  []None   []Other:    ASSESSMENT:    GOALS:  Patient stated goal: To have full function of both UEs   []? Progressing: []? Met: []? Not Met: []? Adjusted     Therapist goals for Patient:   Short Term Goals: To be achieved in: 2 weeks  1. Independent in HEP and progression per patient tolerance, in order to prevent re-injury. []? Progressing: []? Met: []? Not Met: []? Adjusted   2. Patient will have a decrease in pain to facilitate improvement in movement, function, and ADLs as indicated by Functional Deficits. []? Progressing: []? Met: []? Not Met: []? Adjusted     Long Term Goals to be achieved in 12 weeks (through 8/19/21), including patient directed goals to address patient identified performance deficits:  1) Pt to be independent in graded HEP progression with a good level of effort and compliance. []? Progressing: []? Met: []? Not Met: []? Adjusted   2) Pt to report a score of </= 30 % on the Quick DASH disability questionnaire for increased performance with carrying, moving, and handling objects. []? Progressing: []? Met: []? Not Met: []? Adjusted   3) Pt will demonstrate increased ROM to [facility] for improved independence with self care, home mgt, leisure activities, driving and sleeping.  []? Progressing: []? Met: []? Not Met: []?  Adjusted   4) Pt will demonstrate increased strength to at least 25# of  strength in both hands for improved independence with  self care, home mgt, leisure activities, driving and sleeping.  []? Progressing: []? Met: []? Not Met: []? Adjusted   5) Pt will have a decrease in pain to 2/10 to facilitate  self care, home mgt, leisure activities, driving and sleeping.  []? Progressing: []? Met: []? Not Met: []? Adjusted    Overall Progression Towards Functional Goals/Treatment Progress Update:  [] Patient is progressing as expected towards functional goals listed. [] Progression is slowed due to complexities/impairments listed. [] Progression has been slowed due to co-morbidities. [x] Plan just implemented, too soon to assess goals progression <30 days  [] Goals require adjustment due to lack of progress  [] Patient is not progressing as expected and requires additional follow up with physician  [] All goals are met  [] Other:     Prognosis for POC: [x] Good [] Fair  [] Poor    Patient requires continued skilled intervention: [x] Yes  [] No    Treatment/Activity Tolerance:  [x] Patient able to complete treatment  [] Patient limited by fatigue  [] Patient limited by pain    [] Patient limited by other medical complications  [] Other:                  PLAN: See eval  [] Continue per plan of care [] Alter current plan (see comments above)  [x] Plan of care initiated [] Hold pending MD visit [] Discharge      Electronically signed by:  Dipika Grajeda OT/L [id number]    Note: If patient does not return for scheduled/ recommended follow up visits, this note will serve as a discharge from care along with most recent update on progress.   Phone: [phone]  Fax [phone]

## 2021-06-02 ENCOUNTER — HOSPITAL ENCOUNTER (OUTPATIENT)
Dept: OCCUPATIONAL THERAPY | Age: 66
Setting detail: THERAPIES SERIES
Discharge: HOME | End: 2021-06-02
Payer: MEDICARE

## 2021-06-02 ENCOUNTER — HOSPITAL ENCOUNTER (OUTPATIENT)
Dept: PHYSICAL THERAPY | Age: 66
Setting detail: THERAPIES SERIES
End: 2021-06-02
Payer: MEDICARE

## 2021-06-02 PROCEDURE — 97140 MANUAL THERAPY 1/> REGIONS: CPT | Performed by: OCCUPATIONAL THERAPIST

## 2021-06-02 PROCEDURE — 97112 NEUROMUSCULAR REEDUCATION: CPT | Performed by: OCCUPATIONAL THERAPIST

## 2021-06-02 PROCEDURE — 97022 WHIRLPOOL THERAPY: CPT | Performed by: OCCUPATIONAL THERAPIST

## 2021-06-02 PROCEDURE — 97110 THERAPEUTIC EXERCISES: CPT | Performed by: OCCUPATIONAL THERAPIST

## 2021-06-02 NOTE — FLOWSHEET NOTE
[facility], [address]    Occupational Therapy Treatment Note/ Progress Report:     Is this a Progress Report:     []  Yes  [x]  No      If Yes:  Date Range for reporting period:  Beginning 21    Ending    Progress report will be due (10 Rx or 30 days whichever is WCTW):    Recertification will be due (POC Duration  / 90 days whichever is less):21  Patient: Bonnita Eisenmenger                                    : 1955                                  MRN: [id number]  Referring Physician:  Farzana Correa                                            Evaluation Date: 2021                                           Medical Diagnosis Information:  Diagnosis: R distal radius fx  V69.972D    Treatment Dx: Othelia Yanira wrist painM25. 009                                Date of Injury: 21  Date of Surgery: 21  R wrist ORIF                                Insurance information:   /[facility]     Date of Patient follow up with Physician:4 wksHas the plan of care been signed (Y/N):        []  Yes  [x]  No       Visit # Insurance Allowable Auth Required   4 ? []  Yes []  No    From 21  to 2021      Preferred Language for Healthcare:   [x]English       []other:     RESTRICTIONS/PRECAUTIONS: Pt has cast L wrist. R wrist to follow distal radius fx protocol for ORIF     Latex Allergy:  []? No      []? Yes                    Pacemaker:  []? No       []? Yes         Functional Scale: 98% (Quick DASH)                                 Date assessed:  2021     SUBJECTIVE: Pt reports frustration over injury and difficulty with functional tasks   Patient reported deficits/history of current problem: Pt tripped over block in sidewalk     Pain Scale:3-410            []? Constant                [x]? Intermittent              []?other:  Pain Location:  wrist  Easing factors:ice  Provocative factors: AROM      OBJECTIVE: At this time tx is on R wrist only  Date:  Hand Dominance:     []? Right    [x]? Left 5/19/2021 6/2/21   Objective Measures:      PAIN 4/10    Quick DASH 98%    Digits tip to DPFC in cm WFL    Thumb ROM WFL    Thumb opposition  WFL    Thumb Radial/Palmar abd ROM R:  L:    Wrist ROM Ext/Flex R: 62/45  L: 72/65   Rad/Uln dev ROM R:  L:    Forearm ROM  Sup/pron R:75/90  L: 85/90   Elbow ROM Ext/flex WFL:    Shoulder Flex  Shoulder Abd  Shoulder IR/ER      Edema in cm circumf. MCPJs R:  L:    Edema in cm circumf.   Wrist R:  L:     strength in lbs NT    Pinch Strengthin lbs: lat  R:  L:    Pinch Strength in lbs:  3 point R:  L:       MMT:       Observations:  (including splints, bandages, incisions, scars): Steri strips still present R wrist     L wrist still in cast              At this time tx is on R wrist only  MODALITIES: 5/19/21 5/26/21 6/2/21   Fluidotherapy (15969)  12' 15'   Estim (82029/38698)      Paraffin (48064)      US (77481)      Iontophoresis (62528)      Hot Pack HP 10'     Cold Pack            INTERVENTIONS:      Pt educ HEP A/PROM Add to HEP: Scar massage; making paper wads          Power web  [employer] bearing on hand Light Wt bearing on hand   putty  Yellow  Rolling, shaping Yellow  Rolling, shaping; pinching      Red flex bar wrist flex/ext      Ext ball                                             Manual Therapy (96493) STM, gentle PROM STM, gentle PROM; scar massage STM, gentle PROM; scar massage   (IASTM, Dry Needling, manual mobilization)            Splinting      Lcode:      Orthotic Mgmt, Subsequent Enc (25440)      Orthotic Mgmt & Training (14963)            Other:              Therapeutic Exercise & NMR:  [] (89776) Provided verbal/tactile cueing for activities related to strengthening, flexibility, endurance, ROM  for improvements in scapular, scapulothoracic and UE control with self care, reaching, carrying, lifting, house/yardwork, driving/computer work.    [] (01991) Provided verbal/tactile cueing for activities related to improving balance, coordination, kinesthetic sense, posture, motor skill, proprioception  to assist with  scapular, scapulothoracic and UE control with self care, reaching, carrying, lifting, house/yardwork, driving/computer work.     Therapeutic Activities & NMR:    [] (15745 or 24574) Provided verbal/tactile cueing for activities related to improving balance, coordination, kinesthetic sense, posture, motor skill, proprioception and motor activation to allow for proper function of scapular, scapulothoracic and UE control with self care, carrying, lifting, driving/computer work    Home Exercise Program:    [] (38230) Reviewed/Progressed HEP activities related to strengthening, flexibility, endurance, ROM of scapular, scapulothoracic and UE control with self care, reaching, carrying, lifting, house/yardwork, driving/computer work  [] (86386) Reviewed/Progressed HEP activities related to improving balance, coordination, kinesthetic sense, posture, motor skill, proprioception of scapular, scapulothoracic and UE control with self care, reaching, carrying, lifting, house/yardwork, driving/computer work      Manual Treatments:  PROM / STM / Oscillations-Mobs:  G-I, II, III, IV (PA's, Inf., Post.)  [] (37087) Provided manual therapy to mobilize soft tissue/joints of cervical/CT, scapular GHJ and UE for the purpose of modulating pain, promoting relaxation,  increasing ROM, reducing/eliminating soft tissue swelling/inflammation/restriction, improving soft tissue extensibility and allowing for proper ROM for normal function with self care, reaching, carrying, lifting, house/yardwork, driving/computer work    ADL Training:  [] (25991) Provided self-care/home management training related to activities of daily living and compensatory training, and/or use of adaptive equipment      Charges:  Timed Code Treatment Minutes: 39'   Total Treatment Minutes: 61'   Worker's Comp: Time In/Time Out     [] EVAL (LOW) 06113 (typically 20 minutes face-to-face)    [] EVAL (MOD) 40906 (typically 30 minutes face-to-face)  [] EVAL (HIGH) 22865 (typically 45 minutes face-to-face)  [] OT Re-eval (71900)       [x] Alona (19230) x 1     [] XAECI(38716)  [x] NMR (63548) x  1    [] Estim (attended) (82896)   [x] Manual (58841) x   1   [] US (39115)  [] TA (25583) x      [] Paraffin (02179)  [] ADL  (80654) x     [] Splint/L code:    [] Estim (unattended) (00036)  [x] Fluidotherapy (61336)  [] Other:    Comorbidities Affecting Functional Performance:     []Anxiety (F41.9)/Depression (F32.9)   []Diabetes Type 1(E10.65) or 2 (E11.65)   []Rheumatoid Arthritis (M05.9)  []Fibromyalgia (M79.7)  []Neuropathy(G60.9)  []Osteoarthritis(M19.91)  []None   []Other:    ASSESSMENT:  Pt making good progress with AROM  GOALS:  Patient stated goal: To have full function of both UEs   []? Progressing: []? Met: []? Not Met: []? Adjusted     Therapist goals for Patient:   Short Term Goals: To be achieved in: 2 weeks  1. Independent in HEP and progression per patient tolerance, in order to prevent re-injury. []? Progressing: []? Met: []? Not Met: []? Adjusted   2. Patient will have a decrease in pain to facilitate improvement in movement, function, and ADLs as indicated by Functional Deficits. []? Progressing: []? Met: []? Not Met: []? Adjusted     Long Term Goals to be achieved in 12 weeks (through 8/19/21), including patient directed goals to address patient identified performance deficits:  1) Pt to be independent in graded HEP progression with a good level of effort and compliance. []? Progressing: []? Met: []? Not Met: []? Adjusted   2) Pt to report a score of </= 30 % on the Quick DASH disability questionnaire for increased performance with carrying, moving, and handling objects. []? Progressing: []? Met: []? Not Met: []?  Adjusted   3) Pt will demonstrate increased ROM to [facility] for improved independence with self care, home mgt, leisure activities, driving and sleeping.  []? Progressing: []? Met: []? Not Met: []? Adjusted   4) Pt will demonstrate increased strength to at least 25# of  strength in both hands for improved independence with  self care, home mgt, leisure activities, driving and sleeping.  []? Progressing: []? Met: []? Not Met: []? Adjusted   5) Pt will have a decrease in pain to 2/10 to facilitate  self care, home mgt, leisure activities, driving and sleeping.  []? Progressing: []? Met: []? Not Met: []? Adjusted    Overall Progression Towards Functional Goals/Treatment Progress Update:  [] Patient is progressing as expected towards functional goals listed. [] Progression is slowed due to complexities/impairments listed. [] Progression has been slowed due to co-morbidities. [x] Plan just implemented, too soon to assess goals progression <30 days  [] Goals require adjustment due to lack of progress  [] Patient is not progressing as expected and requires additional follow up with physician  [] All goals are met  [] Other:     Prognosis for POC: [x] Good [] Fair  [] Poor    Patient requires continued skilled intervention: [x] Yes  [] No    Treatment/Activity Tolerance:  [x] Patient able to complete treatment  [] Patient limited by fatigue  [] Patient limited by pain    [] Patient limited by other medical complications  [] Other:                  PLAN: See eval  [] Continue per plan of care [] Alter current plan (see comments above)  [x] Plan of care initiated [] Hold pending MD visit [] Discharge      Electronically signed by:  Destinee Gilbert OT/L [id number]    Note: If patient does not return for scheduled/ recommended follow up visits, this note will serve as a discharge from care along with most recent update on progress.   Phone: [phone]  Fax [phone]

## 2021-06-09 ENCOUNTER — HOSPITAL ENCOUNTER (OUTPATIENT)
Dept: OCCUPATIONAL THERAPY | Age: 66
Setting detail: THERAPIES SERIES
Discharge: HOME | End: 2021-06-09
Payer: MEDICARE

## 2021-06-09 PROCEDURE — L3906 WHO W/O JOINTS CF: HCPCS | Performed by: OCCUPATIONAL THERAPIST

## 2021-06-09 PROCEDURE — 97112 NEUROMUSCULAR REEDUCATION: CPT | Performed by: OCCUPATIONAL THERAPIST

## 2021-06-09 PROCEDURE — 97022 WHIRLPOOL THERAPY: CPT | Performed by: OCCUPATIONAL THERAPIST

## 2021-06-09 NOTE — FLOWSHEET NOTE
[facility], [address]    Occupational Therapy Treatment Note/ Progress Report:     Is this a Progress Report:     []  Yes  [x]  No      If Yes:  Date Range for reporting period:  Beginning 21    Ending    Progress report will be due (10 Rx or 30 days whichever is NGXL):24    Recertification will be due (POC Duration  / 90 days whichever is less):21  Patient: Kinsey Hoover                                    : 1955                                  MRN: [id number]  Referring Physician:  Phong Slade                                            Evaluation Date: 2021                                           Medical Diagnosis Information:  Diagnosis: R distal radius fx  B86.784D    Treatment Dx: Pro Jean wrist painM25. 370                                Date of Injury: 21  Date of Surgery: 21  R wrist ORIF                                Insurance information:   /[facility]     Date of Patient follow up with Physician:4 wksHas the plan of care been signed (Y/N):        []  Yes  [x]  No       Visit # Insurance Allowable Auth Required   4 ? []  Yes []  No    From 21  to 2021      Preferred Language for Healthcare:   [x]English       []other:     RESTRICTIONS/PRECAUTIONS: Pt has cast L wrist. R wrist to follow distal radius fx protocol for ORIF     Latex Allergy:  []? No      []? Yes                    Pacemaker:  []? No       []? Yes         Functional Scale: 98% (Quick DASH)                                 Date assessed:  2021     SUBJECTIVE: Received orders for eval/ treat L wrist and for custom splint rochelle   Patient reported deficits/history of current problem: Pt tripped over block in sidewalk     Pain Scale:3-410            []? Constant                [x]? Intermittent              []?other:  Pain Location:  wrist  Easing factors:ice  Provocative factors: AROM      OBJECTIVE: At this time tx is on R wrist only  Date:  Hand Dominance:     []? Right    [x]? Left 5/19/2021 6/2/21 6/9/21   Objective Measures:       PAIN 4/10     Quick DASH 98%     Digits tip to DPFC in cm WFL     Thumb ROM WFL     Thumb opposition  WFL     Thumb Radial/Palmar abd ROM R:  L:     Wrist ROM Ext/Flex R: 62/45  L: 72/65   L: 75/55   Rad/Uln dev ROM R:  L:     Forearm ROM  Sup/pron R:75/90  L: 85/90   L :90/90   Elbow ROM Ext/flex WFL:     Shoulder Flex  Shoulder Abd  Shoulder IR/ER       Edema in cm circumf. MCPJs R:  L:     Edema in cm circumf.   Wrist R:  L:      strength in lbs NT     Pinch Strengthin lbs: lat  R:  L:     Pinch Strength in lbs:  3 point R:  L:        MMT:        Observations:  (including splints, bandages, incisions, scars): Steri strips still present R wrist     L wrist still in cast               At this time tx is on R wrist only  MODALITIES: 5/19/21 5/26/21 6/2/21 6/9/21   Fluidotherapy (71948)  12' 15' 15'   Estim (29372/52819)       Paraffin (26492)       US (97431)       Iontophoresis (72589)       Hot Pack HP 10'      Cold Pack              INTERVENTIONS:       Pt educ HEP A/PROM Add to HEP: Scar massage; making paper wads  Added L wrist AROM and gentle PROM to HEP          Power web  Light Wt bearing on hand Light Wt bearing on hand    putty  Yellow  Rolling, shaping Yellow  Rolling, shaping; pinching       Red flex bar wrist flex/ext       Ext ball                                                     Manual Therapy (12032) STM, gentle PROM STM, gentle PROM; scar massage STM, gentle PROM; scar massage    (IASTM, Dry Needling, manual mobilization)              Splinting       Lcode:    Custom wrist/hand static splint    Orthotic Mgmt, Subsequent Enc (83518)       Orthotic Mgmt & Training (14143)              Other:                Therapeutic Exercise & NMR:  [] (94660) Provided verbal/tactile cueing for activities related to strengthening, flexibility, endurance, ROM  for improvements in scapular, scapulothoracic and UE control with self care, reaching, carrying, lifting, house/yardwork, driving/computer work.    [] (58969) Provided verbal/tactile cueing for activities related to improving balance, coordination, kinesthetic sense, posture, motor skill, proprioception  to assist with  scapular, scapulothoracic and UE control with self care, reaching, carrying, lifting, house/yardwork, driving/computer work.     Therapeutic Activities & NMR:    [] (96604 or 06141) Provided verbal/tactile cueing for activities related to improving balance, coordination, kinesthetic sense, posture, motor skill, proprioception and motor activation to allow for proper function of scapular, scapulothoracic and UE control with self care, carrying, lifting, driving/computer work    Home Exercise Program:    [] (67436) Reviewed/Progressed HEP activities related to strengthening, flexibility, endurance, ROM of scapular, scapulothoracic and UE control with self care, reaching, carrying, lifting, house/yardwork, driving/computer work  [] (05113) Reviewed/Progressed HEP activities related to improving balance, coordination, kinesthetic sense, posture, motor skill, proprioception of scapular, scapulothoracic and UE control with self care, reaching, carrying, lifting, house/yardwork, driving/computer work      Manual Treatments:  PROM / STM / Oscillations-Mobs:  G-I, II, III, IV (PA's, Inf., Post.)  [] (62504) Provided manual therapy to mobilize soft tissue/joints of cervical/CT, scapular GHJ and UE for the purpose of modulating pain, promoting relaxation,  increasing ROM, reducing/eliminating soft tissue swelling/inflammation/restriction, improving soft tissue extensibility and allowing for proper ROM for normal function with self care, reaching, carrying, lifting, house/yardwork, driving/computer work    ADL Training:  [] (27117) Provided self-care/home management training related to activities of daily living and compensatory training, and/or use of adaptive equipment      Charges:  Timed Code Treatment Minutes: 39'   Total Treatment Minutes: 61'   Worker's Comp: Time In/Time Out     [] EVAL (LOW) 22 [id number] (typically 20 minutes face-to-face)    [] EVAL (MOD) 75433 (typically 30 minutes face-to-face)  [] EVAL (HIGH) 43710 (typically 45 minutes face-to-face)  [] OT Re-eval (42929)       [x] Alona [phone]) x 1     [] UTMQR(14094)  [x] NMR (04803) x  1    [] Estim (attended) (86682)   [] Manual ([id number]) x   1   [] US (03572)  [] TA (25345) x      [] Paraffin (33113)  [] ADL  (55073) x     [x] Splint/L code:  custom wrist/hand static spint   [] Estim (unattended) (29 235973)  [x] Fluidotherapy (79918)  [] Other:    Comorbidities Affecting Functional Performance:     []Anxiety (F41.9)/Depression (F32.9)   []Diabetes Type 1(E10.65) or 2 (E11.65)   []Rheumatoid Arthritis (M05.9)  []Fibromyalgia (M79.7)  []Neuropathy(G60.9)  []Osteoarthritis(M19.91)  []None   []Other:    ASSESSMENT:  Pt making good progress with AROM  GOALS:  Patient stated goal: To have full function of both UEs   []? Progressing: []? Met: []? Not Met: []? Adjusted     Therapist goals for Patient:   Short Term Goals: To be achieved in: 2 weeks  1. Independent in HEP and progression per patient tolerance, in order to prevent re-injury. []? Progressing: []? Met: []? Not Met: []? Adjusted   2. Patient will have a decrease in pain to facilitate improvement in movement, function, and ADLs as indicated by Functional Deficits. []? Progressing: []? Met: []? Not Met: []? Adjusted     Long Term Goals to be achieved in 12 weeks (through 8/19/21), including patient directed goals to address patient identified performance deficits:  1) Pt to be independent in graded HEP progression with a good level of effort and compliance. []? Progressing: []? Met: []? Not Met: []?  Adjusted   2) Pt to report a score of </= 30 % on the Quick DASH disability questionnaire for increased performance with carrying, moving, and handling objects. []? Progressing: []? Met: []? Not Met: []? Adjusted   3) Pt will demonstrate increased ROM to [facility] for improved independence with self care, home mgt, leisure activities, driving and sleeping.  []? Progressing: []? Met: []? Not Met: []? Adjusted   4) Pt will demonstrate increased strength to at least 25# of  strength in both hands for improved independence with  self care, home mgt, leisure activities, driving and sleeping.  []? Progressing: []? Met: []? Not Met: []? Adjusted   5) Pt will have a decrease in pain to 2/10 to facilitate  self care, home mgt, leisure activities, driving and sleeping.  []? Progressing: []? Met: []? Not Met: []? Adjusted    Overall Progression Towards Functional Goals/Treatment Progress Update:  [] Patient is progressing as expected towards functional goals listed. [] Progression is slowed due to complexities/impairments listed. [] Progression has been slowed due to co-morbidities. [x] Plan just implemented, too soon to assess goals progression <30 days  [] Goals require adjustment due to lack of progress  [] Patient is not progressing as expected and requires additional follow up with physician  [] All goals are met  [] Other:     Prognosis for POC: [x] Good [] Fair  [] Poor    Patient requires continued skilled intervention: [x] Yes  [] No    Treatment/Activity Tolerance:  [x] Patient able to complete treatment  [] Patient limited by fatigue  [] Patient limited by pain    [] Patient limited by other medical complications  [] Other:                  PLAN: See eval  [] Continue per plan of care [] Alter current plan (see comments above)  [x] Plan of care initiated [] Hold pending MD visit [] Discharge      Electronically signed by:  Zenia Beltran, OT/L [id number]    Note: If patient does not return for scheduled/ recommended follow up visits, this note will serve as a discharge from care along with most recent update on progress.   Phone: [phone]  Fax [phone]

## 2021-06-16 ENCOUNTER — HOSPITAL ENCOUNTER (OUTPATIENT)
Dept: OCCUPATIONAL THERAPY | Age: 66
Setting detail: THERAPIES SERIES
Discharge: HOME | End: 2021-06-16
Payer: MEDICARE

## 2021-06-16 PROCEDURE — 97110 THERAPEUTIC EXERCISES: CPT | Performed by: OCCUPATIONAL THERAPIST

## 2021-06-16 PROCEDURE — 97112 NEUROMUSCULAR REEDUCATION: CPT | Performed by: OCCUPATIONAL THERAPIST

## 2021-06-16 PROCEDURE — 97140 MANUAL THERAPY 1/> REGIONS: CPT | Performed by: OCCUPATIONAL THERAPIST

## 2021-06-23 ENCOUNTER — HOSPITAL ENCOUNTER (OUTPATIENT)
Dept: OCCUPATIONAL THERAPY | Age: 66
Setting detail: THERAPIES SERIES
Discharge: HOME | End: 2021-06-23
Payer: MEDICARE

## 2021-06-23 PROCEDURE — 97035 APP MDLTY 1+ULTRASOUND EA 15: CPT | Performed by: OCCUPATIONAL THERAPIST

## 2021-06-23 PROCEDURE — 97140 MANUAL THERAPY 1/> REGIONS: CPT | Performed by: OCCUPATIONAL THERAPIST

## 2021-06-23 PROCEDURE — 97112 NEUROMUSCULAR REEDUCATION: CPT | Performed by: OCCUPATIONAL THERAPIST

## 2021-06-23 NOTE — FLOWSHEET NOTE
[facility], [place]  [address]    Occupational Therapy Treatment Note/ Progress Report:     Is this a Progress Report:     []  Yes  [x]  No      If Yes:  Date Range for reporting period:  Beginning 21    Ending    Progress report will be due (10 Rx or 30 days whichever is TTHP):    Recertification will be due (POC Duration  / 90 days whichever is less):21  Patient: Paul Soares                                    : 1955                                  MRN: [id number]  Referring Physician:  Yolis Avilez                                            Evaluation Date: 2021                                           Medical Diagnosis Information:  Diagnosis: R distal radius fx  K44.461R    Treatment Dx: Sukumar Sandovali wrist painM25. 803                                Date of Injury: 21  Date of Surgery: 21  R wrist ORIF                                Insurance information:   /[facility]     Date of Patient follow up with Physician:4 wksHas the plan of care been signed (Y/N):        []  Yes  [x]  No       Visit # Insurance Allowable Auth Required   7 ? []  Yes []  No    From 21  to 2021      Preferred Language for Healthcare:   [x]English       []other:     RESTRICTIONS/PRECAUTIONS: Pt has cast L wrist. R wrist to follow distal radius fx protocol for ORIF     Latex Allergy:  []? No      []? Yes                    Pacemaker:  []? No       []? Yes         Functional Scale: 98% (Quick DASH)                                 Date assessed:  2021     SUBJECTIVE: Pt reports no significant pain at R wrist and L wrist pain primarily at dorsum of wrist and hand. Patient reported deficits/history of current problem: Pt tripped over block in sidewalk     Pain Scale:3-4/10 L                      1/10 R            []? Constant                [x]? Intermittent              []?other:  Pain Location:  wrist  Easing factors:ice  Provocative factors: AROM      OBJECTIVE: At this time tx is on R wrist only  Date:  Hand Dominance:     []? Right    [x]? Left 5/19/2021 6/2/21 6/9/21 6/16/21   Objective Measures:        PAIN 4/10      Quick DASH 98%      Digits tip to DPFC in cm WFL      Thumb ROM WFL      Thumb opposition  WFL      Thumb Radial/Palmar abd ROM R:  L:      Wrist ROM Ext/Flex R: 62/45  L: 72/65   L: 75/55 R  80/60  L 80/67   Rad/Uln dev ROM R:  L:      Forearm ROM  Sup/pron R:75/90  L: 85/90   L :90/90    Elbow ROM Ext/flex WFL:      Shoulder Flex  Shoulder Abd  Shoulder IR/ER        Edema in cm circumf. MCPJs R:  L:      Edema in cm circumf.   Wrist R:  L:       strength in lbs NT      Pinch Strengthin lbs: lat  R:  L:      Pinch Strength in lbs:  3 point R:  L:         MMT:         Observations:  (including splints, bandages, incisions, scars): Steri strips still present R wrist     L wrist still in cast                At this time tx is on R wrist only  MODALITIES: 5/19/21 5/26/21 6/2/21 6/9/21 6/16/21 6/23/21   Fluidotherapy (12100)  12' 15' 15'     Estim (04658/11294)         Paraffin (85239)         US (61769)         Iontophoresis (10325)         Hot Pack HP 10'    10' 10'   Cold Pack                  INTERVENTIONS:         Pt educ HEP A/PROM Add to HEP: Scar massage; making paper wads  Added L wrist AROM and gentle PROM to HEP A/PROM R and L forearm, wrist,  AROM R and L forearm, wrist,             Power web  [employer] bearing on hand Light Wt bearing on hand      putty  Yellow  Rolling, shaping Yellow  Rolling, shaping; pinching  Yellow  Rolling, shaping; pinching; mallet Yellow  Rolling, shaping; pinching; mallet      Red flex bar wrist flex/ext   Red flex bar wrist flex/ext      Ext ball  digiflex yellow R and L x 25                                                                   Manual Therapy (06462) STM, gentle PROM STM, gentle PROM; scar massage STM, gentle PROM; scar massage  STM, gentle PROM; scar massage PROM, STM,  SASTM; scar massage   (IASTM, Dry Needling, manual mobilization)                  Splinting         Lcode:    Custom wrist/hand static splint      Orthotic Mgmt, Subsequent Enc (61435)         Orthotic Mgmt & Training (82393)                  Other:                    Therapeutic Exercise & NMR:  [] (22341) Provided verbal/tactile cueing for activities related to strengthening, flexibility, endurance, ROM  for improvements in scapular, scapulothoracic and UE control with self care, reaching, carrying, lifting, house/yardwork, driving/computer work.    [] (48869) Provided verbal/tactile cueing for activities related to improving balance, coordination, kinesthetic sense, posture, motor skill, proprioception  to assist with  scapular, scapulothoracic and UE control with self care, reaching, carrying, lifting, house/yardwork, driving/computer work.     Therapeutic Activities & NMR:    [] (36656 or 66916) Provided verbal/tactile cueing for activities related to improving balance, coordination, kinesthetic sense, posture, motor skill, proprioception and motor activation to allow for proper function of scapular, scapulothoracic and UE control with self care, carrying, lifting, driving/computer work    Home Exercise Program:    [] (59421) Reviewed/Progressed HEP activities related to strengthening, flexibility, endurance, ROM of scapular, scapulothoracic and UE control with self care, reaching, carrying, lifting, house/yardwork, driving/computer work  [] (43635) Reviewed/Progressed HEP activities related to improving balance, coordination, kinesthetic sense, posture, motor skill, proprioception of scapular, scapulothoracic and UE control with self care, reaching, carrying, lifting, house/yardwork, driving/computer work      Manual Treatments:  PROM / STM / Oscillations-Mobs:  G-I, II, III, IV (PA's, Inf., Post.)  [] (48384) Provided manual therapy to mobilize soft tissue/joints of cervical/CT, scapular GHJ and UE for the purpose of modulating pain, promoting relaxation,  increasing ROM, reducing/eliminating soft tissue swelling/inflammation/restriction, improving soft tissue extensibility and allowing for proper ROM for normal function with self care, reaching, carrying, lifting, house/yardwork, driving/computer work    ADL Training:  [] (21476) Provided self-care/home management training related to activities of daily living and compensatory training, and/or use of adaptive equipment      Charges:  Timed Code Treatment Minutes: 54'   Total Treatment Minutes: 72'   Worker's Comp: Time In/Time Out     [] EVAL (LOW) 55825 (typically 20 minutes face-to-face)    [] EVAL (MOD) 29787 (typically 30 minutes face-to-face)  [] EVAL (HIGH) 61235 (typically 45 minutes face-to-face)  [] OT Re-eval (74152)       [x] Alona ([id number]) x 1     [] BVKUM(70426)  [x] NMR (48049) x  1    [] Estim (attended) (96723)   [x] Manual ([address] [facility]) x   2   [] US (84655)  [] TA (29334) x      [] Paraffin (17248)  [] ADL  (05819) x     [] Splint/L code:  custom wrist/hand static spint   [] Estim (unattended) (28178)  [] Fluidotherapy (98862)  [] Other:    Comorbidities Affecting Functional Performance:     []Anxiety (F41.9)/Depression (F32.9)   []Diabetes Type 1(E10.65) or 2 (E11.65)   []Rheumatoid Arthritis (M05.9)  []Fibromyalgia (M79.7)  []Neuropathy(G60.9)  []Osteoarthritis(M19.91)  []None   []Other:    ASSESSMENT:  Pt making good progress with AROM  GOALS:  Patient stated goal: To have full function of both UEs   []? Progressing: []? Met: []? Not Met: []? Adjusted     Therapist goals for Patient:   Short Term Goals: To be achieved in: 2 weeks  1. Independent in HEP and progression per patient tolerance, in order to prevent re-injury. []? Progressing: []? Met: []? Not Met: []? Adjusted   2. Patient will have a decrease in pain to facilitate improvement in movement, function, and ADLs as indicated by Functional Deficits. Patient limited by other medical complications  [] Other:                  PLAN: See eval  [] Continue per plan of care [] Alter current plan (see comments above)  [x] Plan of care initiated [] Hold pending MD visit [] Discharge      Electronically signed by:  Vicki Lovelace OT/L [id number]    Note: If patient does not return for scheduled/ recommended follow up visits, this note will serve as a discharge from care along with most recent update on progress.   Phone: [phone]  Fax [phone]

## 2021-06-30 ENCOUNTER — HOSPITAL ENCOUNTER (OUTPATIENT)
Dept: OCCUPATIONAL THERAPY | Age: 66
Setting detail: THERAPIES SERIES
Discharge: HOME | End: 2021-06-30
Payer: MEDICARE

## 2021-06-30 ENCOUNTER — OFFICE VISIT (OUTPATIENT)
Dept: ORTHOPEDIC SURGERY | Age: 66
End: 2021-06-30
Payer: MEDICARE

## 2021-06-30 VITALS — HEIGHT: 66 IN | BODY MASS INDEX: 27.97 KG/M2 | WEIGHT: 174 LBS

## 2021-06-30 DIAGNOSIS — M17.0 PRIMARY OSTEOARTHRITIS OF BOTH KNEES: Primary | ICD-10-CM

## 2021-06-30 PROCEDURE — 97035 APP MDLTY 1+ULTRASOUND EA 15: CPT | Performed by: OCCUPATIONAL THERAPIST

## 2021-06-30 PROCEDURE — 1036F TOBACCO NON-USER: CPT | Performed by: ORTHOPAEDIC SURGERY

## 2021-06-30 PROCEDURE — 3017F COLORECTAL CA SCREEN DOC REV: CPT | Performed by: ORTHOPAEDIC SURGERY

## 2021-06-30 PROCEDURE — 97110 THERAPEUTIC EXERCISES: CPT | Performed by: OCCUPATIONAL THERAPIST

## 2021-06-30 PROCEDURE — 99213 OFFICE O/P EST LOW 20 MIN: CPT | Performed by: ORTHOPAEDIC SURGERY

## 2021-06-30 PROCEDURE — 1090F PRES/ABSN URINE INCON ASSESS: CPT | Performed by: ORTHOPAEDIC SURGERY

## 2021-06-30 PROCEDURE — 4040F PNEUMOC VAC/ADMIN/RCVD: CPT | Performed by: ORTHOPAEDIC SURGERY

## 2021-06-30 PROCEDURE — 1123F ACP DISCUSS/DSCN MKR DOCD: CPT | Performed by: ORTHOPAEDIC SURGERY

## 2021-06-30 PROCEDURE — 20610 DRAIN/INJ JOINT/BURSA W/O US: CPT | Performed by: ORTHOPAEDIC SURGERY

## 2021-06-30 PROCEDURE — 97140 MANUAL THERAPY 1/> REGIONS: CPT | Performed by: OCCUPATIONAL THERAPIST

## 2021-06-30 PROCEDURE — G8417 CALC BMI ABV UP PARAM F/U: HCPCS | Performed by: ORTHOPAEDIC SURGERY

## 2021-06-30 PROCEDURE — G8400 PT W/DXA NO RESULTS DOC: HCPCS | Performed by: ORTHOPAEDIC SURGERY

## 2021-06-30 PROCEDURE — G8427 DOCREV CUR MEDS BY ELIG CLIN: HCPCS | Performed by: ORTHOPAEDIC SURGERY

## 2021-06-30 NOTE — FLOWSHEET NOTE
[facility], [address]    Occupational Therapy Treatment Note/ Progress Report:     Is this a Progress Report:     []  Yes  [x]  No      If Yes:  Date Range for reporting period:  Beginning 21    Ending    Progress report will be due (10 Rx or 30 days whichever is GRYK):    Recertification will be due (POC Duration  / 90 days whichever is less):21  Patient: Joesph Severe                                    : 1955                                  MRN: [id number]  Referring Physician:  Jt Trejo                                            Evaluation Date: 2021                                           Medical Diagnosis Information:  Diagnosis: R distal radius fx  S84.328R    Treatment Dx: Dimple Carina wrist painM25. 564                                Date of Injury: 21  Date of Surgery: 21  R wrist ORIF                                Insurance information:   /[facility]     Date of Patient follow up with Physician:4 wksHas the plan of care been signed (Y/N):        []  Yes  [x]  No       Visit # Insurance Allowable Auth Required   8 BMN []  Yes []  No    From 21  to 2021      Preferred Language for Healthcare:   [x]English       []other:     RESTRICTIONS/PRECAUTIONS: Pt has cast L wrist. R wrist to follow distal radius fx protocol for ORIF     Latex Allergy:  []? No      []? Yes                    Pacemaker:  []? No       []? Yes         Functional Scale: 98% (Quick DASH)                                 Date assessed:  2021     SUBJECTIVE: Pt reports \"the  said I was healing well and could transition out of splint\"  C/o's of dorsal L wrist pain    Patient reported deficits/history of current problem: Pt tripped over block in sidewalk     Pain Scale:3-4/10 L                      1/10 R            []? Constant                [x]? Intermittent              []?other:  Pain Location: wrist  Easing factors:ice  Provocative factors: AROM      OBJECTIVE: At this time tx is on R wrist only  Date:  Hand Dominance:     []? Right    [x]? Left 5/19/2021 6/2/21 6/9/21 6/16/21   Objective Measures:        PAIN 4/10      Quick DASH 98%      Digits tip to DPFC in cm WFL      Thumb ROM WFL      Thumb opposition  WFL      Thumb Radial/Palmar abd ROM R:  L:      Wrist ROM Ext/Flex R: 62/45  L: 72/65   L: 75/55 R  80/60  L 80/67   Rad/Uln dev ROM R:  L:      Forearm ROM  Sup/pron R:75/90  L: 85/90   L :90/90    Elbow ROM Ext/flex WFL:      Shoulder Flex  Shoulder Abd  Shoulder IR/ER        Edema in cm circumf. MCPJs R:  L:      Edema in cm circumf.   Wrist R:  L:       strength in lbs NT      Pinch Strengthin lbs: lat  R:  L:      Pinch Strength in lbs:  3 point R:  L:         MMT:         Observations:  (including splints, bandages, incisions, scars): Steri strips still present R wrist     L wrist still in cast                At this time tx is on R wrist only  MODALITIES: 5/19/21 5/26/21 6/2/21 6/9/21 6/16/21 6/23/21 6/30/21   Fluidotherapy (65046)  12' 15' 15'      Estim (91153/14951)          Paraffin (83244)          US (86115)       US at 50% 1.5 for 8' L wrist   Iontophoresis (27897)          Hot Pack HP 10'    10' 10'    Cold Pack                    INTERVENTIONS:          Pt educ HEP A/PROM Add to HEP: Scar massage; making paper wads  Added L wrist AROM and gentle PROM to HEP A/PROM R and L forearm, wrist,  AROM R and L forearm, wrist,  A/PROM  R and L forearm and wrist             Power web  [employer] bearing on hand Light Wt bearing on hand       putty  Yellow  Rolling, shaping Yellow  Rolling, shaping; pinching  Yellow  Rolling, shaping; pinching; mallet Yellow  Rolling, shaping; pinching; mallet Yellow  Rolling, shaping; pinching; mallet      Red flex bar wrist flex/ext   Red flex bar wrist flex/ext Red flex bar wrist flex/ext      Ext ball  digiflex yellow R and L x 25 Manual Therapy (98371) STM, gentle PROM STM, gentle PROM; scar massage STM, gentle PROM; scar massage  STM, gentle PROM; scar massage PROM, STM,  SASTM; scar massage PROM, STM,  SASTM; scar massage   (IASTM, Dry Needling, manual mobilization)                    Splinting       kinesiotape at R and L wrists for pain and edema mgt   Lcode:    Custom wrist/hand static splint       Orthotic Mgmt, Subsequent Enc (25882)          Orthotic Mgmt & Training (47454)                    Other:                      Therapeutic Exercise & NMR:  [] (07601) Provided verbal/tactile cueing for activities related to strengthening, flexibility, endurance, ROM  for improvements in scapular, scapulothoracic and UE control with self care, reaching, carrying, lifting, house/yardwork, driving/computer work.    [] (88369) Provided verbal/tactile cueing for activities related to improving balance, coordination, kinesthetic sense, posture, motor skill, proprioception  to assist with  scapular, scapulothoracic and UE control with self care, reaching, carrying, lifting, house/yardwork, driving/computer work.     Therapeutic Activities & NMR:    [] (55270 or 84916) Provided verbal/tactile cueing for activities related to improving balance, coordination, kinesthetic sense, posture, motor skill, proprioception and motor activation to allow for proper function of scapular, scapulothoracic and UE control with self care, carrying, lifting, driving/computer work    Home Exercise Program:    [] (11925) Reviewed/Progressed HEP activities related to strengthening, flexibility, endurance, ROM of scapular, scapulothoracic and UE control with self care, reaching, carrying, lifting, house/yardwork, driving/computer work  [] (65070) Reviewed/Progressed HEP activities related to improving balance, coordination, kinesthetic sense, posture, motor skill, proprioception of scapular, scapulothoracic and UE control with self care, reaching, carrying, lifting, house/yardwork, driving/computer work      Manual Treatments:  PROM / STM / Oscillations-Mobs:  G-I, II, III, IV (PA's, Inf., Post.)  [] (65802) Provided manual therapy to mobilize soft tissue/joints of cervical/CT, scapular GHJ and UE for the purpose of modulating pain, promoting relaxation,  increasing ROM, reducing/eliminating soft tissue swelling/inflammation/restriction, improving soft tissue extensibility and allowing for proper ROM for normal function with self care, reaching, carrying, lifting, house/yardwork, driving/computer work    ADL Training:  [] (59464) Provided self-care/home management training related to activities of daily living and compensatory training, and/or use of adaptive equipment      Charges:  Timed Code Treatment Minutes: 50'   Total Treatment Minutes: 61'   Worker's Comp: Time In/Time Out     [] EVAL (LOW) 96823 (typically 20 minutes face-to-face)    [] EVAL (MOD) 69944 (typically 30 minutes face-to-face)  [] EVAL (HIGH) [phone] (typically 45 minutes face-to-face)  [] OT Re-eval (43641)       [x] Alona [phone]) x 1     [] RHZKF(23663)  [] NMR (96541) x      [] Estim (attended) (27406)   [x] Manual ([phone]) x   2   [x] US (17463)  [] TA (48882) x      [] Paraffin (52944)  [] ADL  ([phone]) x     [] Splint/L code:  custom wrist/hand static spint   [] Estim (unattended) (53528)  [] Fluidotherapy (89991)  [] Other:    Comorbidities Affecting Functional Performance:     []Anxiety (F41.9)/Depression (F32.9)   []Diabetes Type 1(E10.65) or 2 (E11.65)   []Rheumatoid Arthritis (M05.9)  []Fibromyalgia (M79.7)  []Neuropathy(G60.9)  []Osteoarthritis(M19.91)  []None   []Other:    ASSESSMENT:  Pt making good progress with AROM  GOALS:  Patient stated goal: To have full function of both UEs   []? Progressing: []? Met: []? Not Met: []? Adjusted     Therapist goals for Patient:   Short Term Goals: To be achieved in: 2 weeks  1.  Independent in HEP and progression per patient tolerance, in order to prevent re-injury. []? Progressing: []? Met: []? Not Met: []? Adjusted   2. Patient will have a decrease in pain to facilitate improvement in movement, function, and ADLs as indicated by Functional Deficits. []? Progressing: []? Met: []? Not Met: []? Adjusted     Long Term Goals to be achieved in 12 weeks (through 8/19/21), including patient directed goals to address patient identified performance deficits:  1) Pt to be independent in graded HEP progression with a good level of effort and compliance. []? Progressing: []? Met: []? Not Met: []? Adjusted   2) Pt to report a score of </= 30 % on the Quick DASH disability questionnaire for increased performance with carrying, moving, and handling objects. []? Progressing: []? Met: []? Not Met: []? Adjusted   3) Pt will demonstrate increased ROM to [facility] for improved independence with self care, home mgt, leisure activities, driving and sleeping.  []? Progressing: []? Met: []? Not Met: []? Adjusted   4) Pt will demonstrate increased strength to at least 25# of  strength in both hands for improved independence with  self care, home mgt, leisure activities, driving and sleeping.  []? Progressing: []? Met: []? Not Met: []? Adjusted   5) Pt will have a decrease in pain to 2/10 to facilitate  self care, home mgt, leisure activities, driving and sleeping.  []? Progressing: []? Met: []? Not Met: []? Adjusted    Overall Progression Towards Functional Goals/Treatment Progress Update:  [] Patient is progressing as expected towards functional goals listed. [] Progression is slowed due to complexities/impairments listed. [] Progression has been slowed due to co-morbidities.   [x] Plan just implemented, too soon to assess goals progression <30 days  [] Goals require adjustment due to lack of progress  [] Patient is not progressing as expected and requires additional follow up with physician  [] All goals are met  [] Other: Prognosis for POC: [x] Good [] Fair  [] Poor    Patient requires continued skilled intervention: [x] Yes  [] No    Treatment/Activity Tolerance:  [x] Patient able to complete treatment  [] Patient limited by fatigue  [] Patient limited by pain    [] Patient limited by other medical complications  [] Other:                  PLAN: See eval  [] Continue per plan of care [] Alter current plan (see comments above)  [x] Plan of care initiated [] Hold pending MD visit [] Discharge      Electronically signed by:  Seth Nguyen OT/L [id number]    Note: If patient does not return for scheduled/ recommended follow up visits, this note will serve as a discharge from care along with most recent update on progress.   Phone: [phone]  Fax [phone]

## 2021-06-30 NOTE — PROGRESS NOTES
ORTHOPAEDIC SURGERY INITIAL EVALUATION NOTE  Chief Complaint   Patient presents with    Injections     BILATERAL KNEE       HISTORY OF PRESENT ILLNESS:  58-year-old female presents for evaluation of bilateral knee pain left greater than right. She does have a prior history of ACL reconstruction for her left knee and subsequent revision approximately 10 years ago. She is struggled with pain in the knee at times. She is remained active. Her pain is worse with activity. Most of the pain is about the medial aspect of the knee as well as the anterior knee about the patella. She has occasional clicking and popping type sensations of the knee. She denies sensations of instability. She has managed this over the years with occasional injections. She had prior injection with Euflexxa. She has had most recent injection in November 2020. She states that she usually gets approximately 6 months of relief. Her relief has been decreasing with each subsequent series of injections. She has not had any fevers, chills, or night sweats. She also has pain with her right knee and requests bilateral injections. Past Medical History:   Diagnosis Date    Carpal tunnel syndrome 10/20/2014    Diverticulitis     Primary localized osteoarthrosis, hand 10/20/2014    Spinal stenosis        Current Outpatient Medications   Medication Sig Dispense Refill    diclofenac (PENNSAID) 2 % SOLN Place 2 Pump onto the skin 2 times daily 1 Bottle 3    Efinaconazole 10 % SOLN Apply 2 Squirts topically 2 times daily 1 Bottle 0    meloxicam (MOBIC) 15 MG tablet Take 1 tablet by mouth daily 30 tablet 3    PROZAC 40 MG capsule   0    levothyroxine (SYNTHROID) 88 MCG tablet Take 50 mcg by mouth Daily       FLUoxetine (PROZAC) 10 MG capsule Take 60 mg by mouth daily       estrogen, conjugated,-medroxyprogesterone (PREMPRO) 0.3-1.5 MG per tablet Take 1 tablet by mouth daily.       atorvastatin (LIPITOR) 10 MG tablet Take 10 mg by mouth Frequency of Communication with Friends and Family:     Frequency of Social Gatherings with Friends and Family:     Attends Alevism Services:     Active Member of Clubs or Organizations:     Attends Club or Organization Meetings:     Marital Status:    Intimate Partner Violence:     Fear of Current or Ex-Partner:     Emotionally Abused:     Physically Abused:     Sexually Abused:        Review of Systems   Constitutional: Negative for chills and fever. Cardiovascular: Negative for leg swelling. Musculoskeletal: Positive for arthralgias and joint swelling. Skin: Negative for rash and wound. Neurological: Negative for weakness and numbness. PHYSICAL EXAM  Gen: awake, alert, oriented  HEENT: atraumatic, normocephalic  Neck: supple  Resp: equal chest rise bilaterally, no audible wheezes, no accessory muscle use. CV: Lower extremities warm and well perfused. Abd: soft, nontender   Focused examination of the right lower extremity:  There are no cuts, open wounds, or abrasions to the right knee. There is no obvious effusion. There is tenderness to palpation about the medial joint line. No tenderness laterally. There is minor pain with patellar ballottement. Patella tracking is appropriate. There is no significant subpatellar crepitus. There is no ligamentous instability appreciated with varus or valgus stress at 0 or 30 degrees of flexion. There is negative anterior and posterior drawer test.  There is minor pain to the medial joint line with Pedro Pablo exam.  Range of motion actively is full extension to 135 degrees of flexion without difficulty. Sensation is intact to light touch in deep peroneal, superficial peroneal, tibial, sural, and saphenous nerve distributions. Motor function is intact to EHL, FHL, tibialis anterior, and gastroc. There is brisk capillary refill to the toes and a strong palpable dorsalis pedis pulse. Compartments are soft and compressible.   There is no calf tenderness and a negative Homans' sign. Focused examination of the left lower extremity:  There are no cuts, open wounds, or abrasions to the right knee. There are well-healed scars from prior ACL reconstruction. No signs of dehiscence. No open areas. No surrounding erythema. There is no obvious effusion. There is tenderness to palpation about the medial joint line. No tenderness laterally. There is minor pain with patellar ballottement. Patella tracking is appropriate. There is no significant subpatellar crepitus. There is no ligamentous instability appreciated with varus or valgus stress at 0 or 30 degrees of flexion. There is negative anterior and posterior drawer test.  There is minor pain to the medial joint line with Pedro Pablo exam.  Range of motion actively is full extension to 135 degrees of flexion without difficulty. Sensation is intact to light touch in deep peroneal, superficial peroneal, tibial, sural, and saphenous nerve distributions. Motor function is intact to EHL, FHL, tibialis anterior, and gastroc. There is brisk capillary refill to the toes and a strong palpable dorsalis pedis pulse. Compartments are soft and compressible. There is no calf tenderness and a negative Homans' sign. RADIOGRAPHIC EXAM:  No new x-rays obtained today. Previous x-rays were reviewed. These demonstrate medial joint space narrowing, osteophyte formation, minor subchondral sclerosis. Lateral compartments relatively well-preserved but does demonstrate osteoarthritic changes. There is no significant lateral patellar subluxation or tilt. For the left knee, there is evidence of prior bone tunnels for a PCL reconstruction. No orthopedic hardware is present. No fracture or dislocation. ASSESSEMENT AND PLAN    76 y.o. female with the following orthopaedic surgery problems:    1.  Bilateral knee osteoarthritis left greater than right    She has had prior conservative treatment with oral

## 2021-07-07 ENCOUNTER — OFFICE VISIT (OUTPATIENT)
Dept: ORTHOPEDIC SURGERY | Age: 66
End: 2021-07-07
Payer: MEDICARE

## 2021-07-07 ENCOUNTER — HOSPITAL ENCOUNTER (OUTPATIENT)
Dept: OCCUPATIONAL THERAPY | Age: 66
Setting detail: THERAPIES SERIES
Discharge: HOME | End: 2021-07-07
Payer: MEDICARE

## 2021-07-07 VITALS — WEIGHT: 174 LBS | HEIGHT: 66 IN | BODY MASS INDEX: 27.97 KG/M2

## 2021-07-07 DIAGNOSIS — M17.0 PRIMARY OSTEOARTHRITIS OF BOTH KNEES: Primary | ICD-10-CM

## 2021-07-07 PROCEDURE — 20610 DRAIN/INJ JOINT/BURSA W/O US: CPT | Performed by: ORTHOPAEDIC SURGERY

## 2021-07-07 PROCEDURE — 97140 MANUAL THERAPY 1/> REGIONS: CPT | Performed by: OCCUPATIONAL THERAPIST

## 2021-07-07 PROCEDURE — 97110 THERAPEUTIC EXERCISES: CPT | Performed by: OCCUPATIONAL THERAPIST

## 2021-07-07 PROCEDURE — 97530 THERAPEUTIC ACTIVITIES: CPT | Performed by: OCCUPATIONAL THERAPIST

## 2021-07-07 RX ORDER — HYALURONATE SODIUM 10 MG/ML
20 SYRINGE (ML) INTRAARTICULAR ONCE
Status: COMPLETED | OUTPATIENT
Start: 2021-07-07 | End: 2021-07-07

## 2021-07-07 RX ADMIN — Medication 20 MG: at 09:36

## 2021-07-07 RX ADMIN — Medication 20 MG: at 09:35

## 2021-07-07 NOTE — FLOWSHEET NOTE
factors:ice  Provocative factors: AROM      OBJECTIVE: At this time tx is on R wrist only  Date:  Hand Dominance:     []? Right    [x]? Left 5/19/2021 6/2/21 6/9/21 6/16/21 7/7/21   Objective Measures:         PAIN 4/10       Quick DASH 98%       Digits tip to DPFC in cm WFL       Thumb ROM WFL       Thumb opposition  WFL       Thumb Radial/Palmar abd ROM R:  L:       Wrist ROM Ext/Flex R: 62/45  L: 72/65   L: 75/55 R  80/60  L 80/67    Rad/Uln dev ROM R:  L:       Forearm ROM  Sup/pron R:75/90  L: 85/90   L :90/90     Elbow ROM Ext/flex WFL:       Shoulder Flex  Shoulder Abd  Shoulder IR/ER         Edema in cm circumf. MCPJs R:  L:       Edema in cm circumf. Wrist R:  L:        strength in lbs NT    R - 50  L - 42   Pinch Strengthin lbs: lat  R:  L:       Pinch Strength in lbs:  3 point R:  L:          MMT:          Observations:  (including splints, bandages, incisions, scars): Steri strips still present R wrist     L wrist still in cast                 At this time tx is on R wrist only  MODALITIES: 5/19/21 5/26/21 6/2/21 6/9/21 6/16/21 6/23/21 6/30/21 7/7/21   Fluidotherapy (84878)  12' 15' 15'       Estim (16474/53685)           Paraffin (25915)           US (27958)       US at 50% 1.5 for 8' L wrist    Iontophoresis (97556)           Hot Pack HP 10'    10' 10'  10' to precondition tissues   Cold Pack                      INTERVENTIONS:           Pt educ HEP A/PROM Add to HEP: Scar massage; making paper wads  Added L wrist AROM and gentle PROM to HEP A/PROM R and L forearm, wrist,  AROM R and L forearm, wrist,  A/PROM  R and L forearm and wrist Hammer rotation with 8 ounces. 2 lb.  Wrist curls bilaterally wrist flex, ext and RD              Power web  Light Wt bearing on hand Light Wt bearing on hand        putty  Yellow  Rolling, shaping Yellow  Rolling, shaping; pinching  Yellow  Rolling, shaping; pinching; mallet Yellow  Rolling, shaping; pinching; mallet Yellow  Rolling, shaping; pinching; mallet       Red flex bar wrist flex/ext   Red flex bar wrist flex/ext Red flex bar wrist flex/ext Red flexbar individual motions of wrist flex and ext both wrist, combined wrist flex and ext bilaterally and bilateral bending up and down. Ext ball  digiflex yellow R and L x 25   Wristciser bilateral opposite wrist motions. Adjustable hand gripper gold spring second x 40 on both hands. Manual Therapy (04563) STM, gentle PROM STM, gentle PROM; scar massage STM, gentle PROM; scar massage  STM, gentle PROM; scar massage PROM, STM,  SASTM; scar massage PROM, STM,  SASTM; scar massage DTM bilateral forearms with passive wrist stretching in all directions   (IASTM, Dry Needling, manual mobilization)                      Splinting       kinesiotape at R and L wrists for pain and edema mgt Wearing tubigrip as needed   Lcode:    Custom wrist/hand static splint        Orthotic Mgmt, Subsequent Enc (68338)           Orthotic Mgmt & Training (26057)                      Other:                        Therapeutic Exercise & NMR:  [] (22604) Provided verbal/tactile cueing for activities related to strengthening, flexibility, endurance, ROM  for improvements in scapular, scapulothoracic and UE control with self care, reaching, carrying, lifting, house/yardwork, driving/computer work.    [] (23811) Provided verbal/tactile cueing for activities related to improving balance, coordination, kinesthetic sense, posture, motor skill, proprioception  to assist with  scapular, scapulothoracic and UE control with self care, reaching, carrying, lifting, house/yardwork, driving/computer work.     Therapeutic Activities & NMR:    [] (63517 or 91625) Provided verbal/tactile cueing for activities related to improving balance, coordination, kinesthetic sense, posture, motor skill, proprioception and motor activation to allow for proper function of scapular, scapulothoracic and UE control with self care, carrying, lifting, driving/computer work    Home Exercise Program:    [] (02796) Reviewed/Progressed HEP activities related to strengthening, flexibility, endurance, ROM of scapular, scapulothoracic and UE control with self care, reaching, carrying, lifting, house/yardwork, driving/computer work  [] (51817) Reviewed/Progressed HEP activities related to improving balance, coordination, kinesthetic sense, posture, motor skill, proprioception of scapular, scapulothoracic and UE control with self care, reaching, carrying, lifting, house/yardwork, driving/computer work      Manual Treatments:  PROM / STM / Oscillations-Mobs:  G-I, II, III, IV (PA's, Inf., Post.)  [] (71091) Provided manual therapy to mobilize soft tissue/joints of cervical/CT, scapular GHJ and UE for the purpose of modulating pain, promoting relaxation,  increasing ROM, reducing/eliminating soft tissue swelling/inflammation/restriction, improving soft tissue extensibility and allowing for proper ROM for normal function with self care, reaching, carrying, lifting, house/yardwork, driving/computer work    ADL Training:  [] (28870) Provided self-care/home management training related to activities of daily living and compensatory training, and/or use of adaptive equipment      Charges:  Timed Code Treatment Minutes: 45'   Total Treatment Minutes: 55   Worker's Comp: Time In/Time Out     [] EVAL (LOW) 91406 (typically 20 minutes face-to-face)    [] EVAL (MOD) 26221 (typically 30 minutes face-to-face)  [] EVAL (HIGH) [phone] (typically 45 minutes face-to-face)  [] OT Re-eval (03852)       [x] Alona [phone]) x 1     [] NAJHH(19891)  [] NMR (18223) x      [] Estim (attended) (92704)   [x] Manual ([phone]) x   1   [x] US (51278)  [x] TA (83369) x 1      [] Paraffin (18983)  [] ADL  ([phone]) x     [] Splint/L code:  custom wrist/hand static spint   [] Estim (unattended) (22 [phone])  [] Fluidotherapy (83176)  [] Other:    Comorbidities Affecting Functional Performance:     []Anxiety (F41.9)/Depression (F32.9)   []Diabetes Type 1(E10.65) or 2 (E11.65)   []Rheumatoid Arthritis (M05.9)  []Fibromyalgia (M79.7)  []Neuropathy(G60.9)  []Osteoarthritis(M19.91)  []None   []Other:    ASSESSMENT:  Pt making good progress with AROM  GOALS:  Patient stated goal: To have full function of both UEs   []? Progressing: []? Met: []? Not Met: []? Adjusted     Therapist goals for Patient:   Short Term Goals: To be achieved in: 2 weeks  1. Independent in HEP and progression per patient tolerance, in order to prevent re-injury. []? Progressing: []? Met: []? Not Met: []? Adjusted   2. Patient will have a decrease in pain to facilitate improvement in movement, function, and ADLs as indicated by Functional Deficits. []? Progressing: []? Met: []? Not Met: []? Adjusted     Long Term Goals to be achieved in 12 weeks (through 8/19/21), including patient directed goals to address patient identified performance deficits:  1) Pt to be independent in graded HEP progression with a good level of effort and compliance. []? Progressing: []? Met: []? Not Met: []? Adjusted   2) Pt to report a score of </= 30 % on the Quick DASH disability questionnaire for increased performance with carrying, moving, and handling objects. []? Progressing: []? Met: []? Not Met: []? Adjusted   3) Pt will demonstrate increased ROM to [facility] for improved independence with self care, home mgt, leisure activities, driving and sleeping.  []? Progressing: []? Met: []? Not Met: []? Adjusted   4) Pt will demonstrate increased strength to at least 25# of  strength in both hands for improved independence with  self care, home mgt, leisure activities, driving and sleeping.  []? Progressing: []? Met: []? Not Met: []? Adjusted   5) Pt will have a decrease in pain to 2/10 to facilitate  self care, home mgt, leisure activities, driving and sleeping.  []? Progressing: []? Met: []?  Not Met: []? Adjusted    Overall Progression Towards Functional Goals/Treatment Progress Update:  [] Patient is progressing as expected towards functional goals listed. [] Progression is slowed due to complexities/impairments listed. [] Progression has been slowed due to co-morbidities. [x] Plan just implemented, too soon to assess goals progression <30 days  [] Goals require adjustment due to lack of progress  [] Patient is not progressing as expected and requires additional follow up with physician  [] All goals are met  [] Other:     Prognosis for POC: [x] Good [] Fair  [] Poor    Patient requires continued skilled intervention: [x] Yes  [] No    Treatment/Activity Tolerance:  [x] Patient able to complete treatment  [] Patient limited by fatigue  [] Patient limited by pain    [] Patient limited by other medical complications  [] Other:                  PLAN: See eval  [] Continue per plan of care [] Alter current plan (see comments above)  [x] Plan of care initiated [] Hold pending MD visit [] Discharge      Electronically signed by:  Raegan Gotti OT , OTR\L, T - [id number]    Note: If patient does not return for scheduled/ recommended follow up visits, this note will serve as a discharge from care along with most recent update on progress.   Phone: [phone]  Fax [phone]

## 2021-07-07 NOTE — PROGRESS NOTES
ORTHOPAEDIC SURGERY FOLLOWUP VISIT    CHIEF COMPLAINT: bilateral knee pain    DATE OF INJURY: N/A  DATE OF SURGERY: N/A    HISTORY OF PRESENT ILLNESS:  58-year-old female with bilateral knee osteoarthritis left greater than right presenting today for her second Euflexxa injection of the series of 3. Her first injection was given without adverse reaction. She feels as though the knees are feeling well. She has noticed minor improvement since last week. She has not had any fevers, chills, or night sweats. No significant effusion or swelling. PHYSICAL EXAM:  Bilateral knee exam demonstrates ligamentously stable knees. No cuts, open wounds, or abrasions. No erythema. No obvious effusion. Knee range of motion full extension to 130 degrees of flexion bilaterally. Neurovascularly intact. RADIOGRAPHIC EXAM:  No new x-rays obtained today. PROCEDURE NOTE:  Each knee was sterilely prepped over the anterolateral aspect of the knee with the knee in 90 degrees of flexion while in the seated position. The skin was anesthetized using ethyl chloride spray. A 22-gauge needle was used to administer 2 cc Euflexxa via anterolateral approach intra-articularly. She tolerated this well and it was dressed with a Band-Aid. ASSESSMENT AND PLAN:  58-year-old female with bilateral knee osteoarthritis, left greater than right. Viscosupplementation injection given as described above. This was a Euflexxa No. 2 of 3. I will see her back next week for the final shot of the series. In the meantime she will continue with other conservative managements including oral anti-inflammatories, temperature modalities, and activity modification.       Dorothy Corral MD

## 2021-07-14 ENCOUNTER — OFFICE VISIT (OUTPATIENT)
Dept: ORTHOPEDIC SURGERY | Age: 66
End: 2021-07-14
Payer: MEDICARE

## 2021-07-14 ENCOUNTER — APPOINTMENT (OUTPATIENT)
Dept: OCCUPATIONAL THERAPY | Age: 66
End: 2021-07-14
Payer: MEDICARE

## 2021-07-14 VITALS — BODY MASS INDEX: 28.45 KG/M2 | WEIGHT: 177 LBS | HEIGHT: 66 IN

## 2021-07-14 DIAGNOSIS — M17.0 PRIMARY OSTEOARTHRITIS OF BOTH KNEES: Primary | ICD-10-CM

## 2021-07-14 PROCEDURE — 20610 DRAIN/INJ JOINT/BURSA W/O US: CPT | Performed by: ORTHOPAEDIC SURGERY

## 2021-07-14 RX ORDER — HYALURONATE SODIUM 10 MG/ML
40 SYRINGE (ML) INTRAARTICULAR ONCE
Status: COMPLETED | OUTPATIENT
Start: 2021-07-14 | End: 2021-07-14

## 2021-07-14 RX ADMIN — Medication 40 MG: at 13:45

## 2021-07-14 NOTE — PROGRESS NOTES
ORTHOPAEDIC SURGERY FOLLOWUP VISIT     CHIEF COMPLAINT:  Bilateral knee injections     DATE OF INJURY: N/A  DATE OF SURGERY: N/A     HISTORY OF PRESENT ILLNESS:  43-year-old female with bilateral knee osteoarthritis left greater than right. She presents today for Euflexxa injection #3 of her series of 3. She states overall things have been going well. She has not had any negative reactions. No fevers, chills, or night sweats. Overall her pain is minimal.  She has had great relief with the injections so far.     PHYSICAL EXAM:  Bilateral knee exam demonstrates ligamentously stable knees. No cuts, open wounds, or abrasions. No erythema. No obvious effusion. Knee range of motion full extension to 130 degrees of flexion bilaterally. Neurovascularly intact. No significant tenderness to palpation at either joint line. Minimal punctate bruising at prior injection sites.     RADIOGRAPHIC EXAM:  No new x-rays obtained today.     PROCEDURE NOTE:  Each knee was sterilely prepped over the anterolateral aspect of the knee with the knee in 90 degrees of flexion while in the seated position.  The skin was anesthetized using ethyl chloride spray.  A 22-gauge needle was used to administer 2 cc Euflexxa via anterolateral approach intra-articularly.  She tolerated this well and it was dressed with a Band-Aid.     ASSESSMENT AND PLAN:  43-year-old female with bilateral knee osteoarthritis, left greater than right.     The patient has completed her series of 3 Euflexxa injections. Overall seems quite happy with her progress so far. She denies significant knee pain. Advised her to use ice and oral anti-inflammatories as needed. She will continue with all activities of daily living as tolerated.   I would see her back on an as-needed basis in the future.        Radha Zafar MD

## 2021-07-19 ENCOUNTER — HOSPITAL ENCOUNTER (OUTPATIENT)
Dept: OCCUPATIONAL THERAPY | Age: 66
Setting detail: THERAPIES SERIES
Discharge: HOME | End: 2021-07-19
Payer: MEDICARE

## 2021-07-19 PROCEDURE — 97035 APP MDLTY 1+ULTRASOUND EA 15: CPT | Performed by: OCCUPATIONAL THERAPIST

## 2021-07-19 PROCEDURE — 97110 THERAPEUTIC EXERCISES: CPT | Performed by: OCCUPATIONAL THERAPIST

## 2021-07-19 PROCEDURE — 97763 ORTHC/PROSTC MGMT SBSQ ENC: CPT | Performed by: OCCUPATIONAL THERAPIST

## 2021-07-19 PROCEDURE — 97140 MANUAL THERAPY 1/> REGIONS: CPT | Performed by: OCCUPATIONAL THERAPIST

## 2021-07-19 NOTE — FLOWSHEET NOTE
[facility], [address]    Occupational Therapy Treatment Note/ Progress Report:     Is this a Progress Report:     [x]  Yes  []  No      If Yes:  Date Range for reporting period:  Beginning 21    Ending    Progress report will be due (10 Rx or 30 days whichever is XSGN):    Recertification will be due (POC Duration  / 90 days whichever is less):21  Patient: Raymond Khan                                    : 1955                                  MRN: [id number]  Referring Physician:  Brian Tillman                                            Evaluation Date: 2021                                           Medical Diagnosis Information:  Diagnosis: R distal radius fx  X19.971X    Treatment Dx: Kathyanne Gottron wrist painM25. 511                                Date of Injury: 21  Date of Surgery: 21  R wrist ORIF                                Insurance information:   /[facility]     Date of Patient follow up with Physician:4 wksHas the plan of care been signed (Y/N):        []  Yes  [x]  No       Visit # Insurance Allowable Auth Required   10 BMN []  Yes []  No    From 21  to 2021      Preferred Language for Healthcare:   [x]English       []other:     RESTRICTIONS/PRECAUTIONS: Pt has cast L wrist. R wrist to follow distal radius fx protocol for ORIF     Latex Allergy:  []? No      []? Yes                    Pacemaker:  []? No       []? Yes         Functional Scale: 98% (Quick DASH)                                 Date assessed:  2021     SUBJECTIVE: Pt continues to c/o difficulty with DRUJ pain however reports that she has been increasing her activity level to include bathing and grooming her dog. Patient reported deficits/history of current problem: Pt tripped over block in sidewalk     Pain Scale:3-4/10 L                      1/10 R            []? Constant                [x]? Intermittent []?other:  Pain Location:  wrist  Easing factors:ice  Provocative factors: AROM      OBJECTIVE: At this time tx is on R wrist only  Date:  Hand Dominance:     []? Right    [x]? Left 5/19/2021 6/2/21 6/9/21 6/16/21 7/7/21 7/19/21   Objective Measures:          PAIN 4/10        Quick DASH 98%     43%   Digits tip to DPFC in cm WFL        Thumb ROM WFL        Thumb opposition  WFL        Thumb Radial/Palmar abd ROM R:  L:        Wrist ROM Ext/Flex R: 62/45  L: 72/65   L: 75/55 R  80/60  L 80/67  75/67  86/67   Rad/Uln dev ROM R:  L:        Forearm ROM  Sup/pron R:75/90  L: 85/90   L :90/90      Elbow ROM Ext/flex WFL:        Shoulder Flex  Shoulder Abd  Shoulder IR/ER          Edema in cm circumf. MCPJs R:  L:        Edema in cm circumf. Wrist R:  L:         strength in lbs NT    R - 50  L - 42    Pinch Strengthin lbs: lat  R:  L:        Pinch Strength in lbs:  3 point R:  L:           MMT:           Observations:  (including splints, bandages, incisions, scars): Steri strips still present R wrist     L wrist still in cast                  At this time tx is on R wrist only  MODALITIES: 6/9/21 6/16/21 6/23/21 6/30/21 7/7/21 7/19/21   Fluidotherapy (72651) 15'        Estim (52821/30664)         Paraffin (32572)         US (70768)    US at 50% 1.5 for 8' L wrist  8'   Iontophoresis (41083)         Hot Pack  10' 10'  10' to precondition tissues    Cold Pack                  INTERVENTIONS:         Pt educ Added L wrist AROM and gentle PROM to HEP A/PROM R and L forearm, wrist,  AROM R and L forearm, wrist,  A/PROM  R and L forearm and wrist Hammer rotation with 8 ounces. 2 lb.  Wrist curls bilaterally wrist flex, ext and RD             Power web         putty  Yellow  Rolling, shaping; pinching; mallet Yellow  Rolling, shaping; pinching; mallet Yellow  Rolling, shaping; pinching; mallet  Yellow  Rolling, shaping; pinching; mallet      Red flex bar wrist flex/ext Red flex bar wrist flex/ext Red flexbar individual motions of wrist flex and ext both wrist, combined wrist flex and ext bilaterally and bilateral bending up and down. Red flex bar wrist flex/ext     digiflex yellow R and L x 25   Wristciser bilateral opposite wrist motions. Adjustable hand gripper gold spring second x 40 on both hands. Manual Therapy (42490)  STM, gentle PROM; scar massage PROM, STM,  SASTM; scar massage PROM, STM,  SASTM; scar massage DTM bilateral forearms with passive wrist stretching in all directions STM,PROM    (IASTM, Dry Needling, manual mobilization)                  Splinting    kinesiotape at R and L wrists for pain and edema mgt Wearing tubigrip as needed    Lcode: Custom wrist/hand static splint         Orthotic Mgmt, Subsequent Enc (87074)      Fabricated neoprene wrist support to inhibit DRUJ pain   Orthotic Mgmt & Training (33695)                  Other:                    Therapeutic Exercise & NMR:  [] (43318) Provided verbal/tactile cueing for activities related to strengthening, flexibility, endurance, ROM  for improvements in scapular, scapulothoracic and UE control with self care, reaching, carrying, lifting, house/yardwork, driving/computer work.    [] (08229) Provided verbal/tactile cueing for activities related to improving balance, coordination, kinesthetic sense, posture, motor skill, proprioception  to assist with  scapular, scapulothoracic and UE control with self care, reaching, carrying, lifting, house/yardwork, driving/computer work.     Therapeutic Activities & NMR:    [] (72158 or 42448) Provided verbal/tactile cueing for activities related to improving balance, coordination, kinesthetic sense, posture, motor skill, proprioception and motor activation to allow for proper function of scapular, scapulothoracic and UE control with self care, carrying, lifting, driving/computer work    Home Exercise Program:    [] (82423) Reviewed/Progressed HEP activities related to strengthening, flexibility, endurance, ROM of scapular, scapulothoracic and UE control with self care, reaching, carrying, lifting, house/yardwork, driving/computer work  [] (65088) Reviewed/Progressed HEP activities related to improving balance, coordination, kinesthetic sense, posture, motor skill, proprioception of scapular, scapulothoracic and UE control with self care, reaching, carrying, lifting, house/yardwork, driving/computer work      Manual Treatments:  PROM / STM / Oscillations-Mobs:  G-I, II, III, IV (PA's, Inf., Post.)  [] (36609) Provided manual therapy to mobilize soft tissue/joints of cervical/CT, scapular GHJ and UE for the purpose of modulating pain, promoting relaxation,  increasing ROM, reducing/eliminating soft tissue swelling/inflammation/restriction, improving soft tissue extensibility and allowing for proper ROM for normal function with self care, reaching, carrying, lifting, house/yardwork, driving/computer work    ADL Training:  [] (08194) Provided self-care/home management training related to activities of daily living and compensatory training, and/or use of adaptive equipment      Charges:  Timed Code Treatment Minutes: 39'   Total Treatment Minutes: 61'   Worker's Comp: Time In/Time Out     [] EVAL (LOW) 58792 (typically 20 minutes face-to-face)    [] EVAL (MOD) 87360 (typically 30 minutes face-to-face)  [] EVAL (HIGH) 54355 (typically 45 minutes face-to-face)  [] OT Re-eval (99769)       [x] Alona (43485) x 1     [] IHVJW(98123)  [] NMR (42560) x      [] Estim (attended) (60150)   [x] Manual ([id number]) x   1   [x] US (33685)  [] TA (69344) x 1      [] Paraffin (52295)  [] ADL  (72721) x     [] Splint/L code:  custom wrist/hand static spint   [] Estim (unattended) (49969)  [] Fluidotherapy (81891)  [x] Other: Orthotic Mgmt, Subsequent Enc (90248)    Comorbidities Affecting Functional Performance:     []Anxiety (F41.9)/Depression (F32.9)   []Diabetes Type 1(E10.65) or 2 (E11.65)   []Rheumatoid Arthritis (M05.9)  []Fibromyalgia (M79.7)  []Neuropathy(G60.9)  []Osteoarthritis(M19.91)  []None   []Other:    ASSESSMENT:  Pt making good progress with AROM  GOALS:  Patient stated goal: To have full function of both UEs   []? Progressing: []? Met: []? Not Met: []? Adjusted     Therapist goals for Patient:   Short Term Goals: To be achieved in: 2 weeks  1. Independent in HEP and progression per patient tolerance, in order to prevent re-injury. []? Progressing: [x]? Met: []? Not Met: []? Adjusted   2. Patient will have a decrease in pain to facilitate improvement in movement, function, and ADLs as indicated by Functional Deficits. []? Progressing: [x]? Met: []? Not Met: []? Adjusted     Long Term Goals to be achieved in 12 weeks (through 8/19/21), including patient directed goals to address patient identified performance deficits:  1) Pt to be independent in graded HEP progression with a good level of effort and compliance. []? Progressing: [x]? Met: []? Not Met: []? Adjusted   2) Pt to report a score of </= 30 % on the Quick DASH disability questionnaire for increased performance with carrying, moving, and handling objects. [x]? Progressing: []? Met: []? Not Met: []? Adjusted   3) Pt will demonstrate increased ROM to [facility] for improved independence with self care, home mgt, leisure activities, driving and sleeping.  []? Progressing: [x]? Met: []? Not Met: []? Adjusted   4) Pt will demonstrate increased strength to at least 25# of  strength in both hands for improved independence with  self care, home mgt, leisure activities, driving and sleeping.  []? Progressing: [x]? Met: []? Not Met: []? Adjusted   5) Pt will have a decrease in pain to 2/10 to facilitate  self care, home mgt, leisure activities, driving and sleeping. [x]? Progressing: []? Met: []? Not Met: []?  Adjusted    Overall Progression Towards Functional Goals/Treatment Progress Update:  [x] Patient is progressing as expected towards functional goals listed. [] Progression is slowed due to complexities/impairments listed. [] Progression has been slowed due to co-morbidities. [] Plan just implemented, too soon to assess goals progression <30 days  [] Goals require adjustment due to lack of progress  [] Patient is not progressing as expected and requires additional follow up with physician  [] All goals are met  [] Other:     Prognosis for POC: [x] Good [] Fair  [] Poor    Patient requires continued skilled intervention: [x] Yes  [] No    Treatment/Activity Tolerance:  [x] Patient able to complete treatment  [] Patient limited by fatigue  [] Patient limited by pain    [] Patient limited by other medical complications  [] Other:                  PLAN: See eval  [x] Continue per plan of care [] Alter current plan (see comments above)  [] Plan of care initiated [] Hold pending MD visit [] Discharge      Electronically signed by:  Raphael Wilson OT/DONNA [id number]    Note: If patient does not return for scheduled/ recommended follow up visits, this note will serve as a discharge from care along with most recent update on progress.   Phone: [phone]  Fax [phone]

## 2021-07-21 ENCOUNTER — APPOINTMENT (OUTPATIENT)
Dept: OCCUPATIONAL THERAPY | Age: 66
End: 2021-07-21
Payer: MEDICARE

## 2021-07-28 ENCOUNTER — HOSPITAL ENCOUNTER (OUTPATIENT)
Dept: OCCUPATIONAL THERAPY | Age: 66
Setting detail: THERAPIES SERIES
Discharge: HOME | End: 2021-07-28
Payer: MEDICARE

## 2021-07-28 PROCEDURE — 97110 THERAPEUTIC EXERCISES: CPT | Performed by: OCCUPATIONAL THERAPIST

## 2021-07-28 PROCEDURE — 97140 MANUAL THERAPY 1/> REGIONS: CPT | Performed by: OCCUPATIONAL THERAPIST

## 2021-07-28 PROCEDURE — G0283 ELEC STIM OTHER THAN WOUND: HCPCS | Performed by: OCCUPATIONAL THERAPIST

## 2021-07-28 PROCEDURE — 97112 NEUROMUSCULAR REEDUCATION: CPT | Performed by: OCCUPATIONAL THERAPIST

## 2021-07-28 NOTE — FLOWSHEET NOTE
[facility], [address], [address] [place] [address]    Occupational Therapy Treatment Note/ Progress Report:     Is this a Progress Report:     [x]  Yes  []  No      If Yes:  Date Range for reporting period:  Beginning 21    Ending    Progress report will be due (10 Rx or 30 days whichever is SMLZ):    Recertification will be due (POC Duration  / 90 days whichever is less):21  Patient: Hilary Echavarria                                    : 1955                                  MRN: [id number]  Referring Physician:  Gretchen Gautam                                            Evaluation Date: 2021                                           Medical Diagnosis Information:  Diagnosis: R distal radius fx  H96.862C    Treatment Dx: Caretha Speck wrist painM25. 768                                Date of Injury: 21  Date of Surgery: 21  R wrist ORIF                                Insurance information:   /[facility]     Date of Patient follow up with Physician:4 wksHas the plan of care been signed (Y/N):        []  Yes  [x]  No       Visit # Insurance Allowable Auth Required   11 BMN []  Yes []  No    From 21  to 2021      Preferred Language for Healthcare:   [x]English       []other:     RESTRICTIONS/PRECAUTIONS: Pt has cast L wrist. R wrist to follow distal radius fx protocol for ORIF     Latex Allergy:  []? No      []? Yes                    Pacemaker:  []? No       []? 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 \"My whole body hurts but especially my left wrist.\"     Patient reported deficits/history of current problem: Pt tripped over block in sidewalk     Pain Scale 6/10                      1/10 R            []? Constant                [x]? Intermittent []?other:  Pain Location:  wrist  Easing factors:ice  Provocative factors: AROM      OBJECTIVE: At this time tx is on R wrist only  Date:  Hand Dominance:     []? Right    [x]? Left 5/19/2021 6/2/21 6/9/21 6/16/21 7/7/21 7/19/21   Objective Measures:          PAIN 4/10        Quick DASH 98%     43%   Digits tip to DPFC in cm WFL        Thumb ROM WFL        Thumb opposition  WFL        Thumb Radial/Palmar abd ROM R:  L:        Wrist ROM Ext/Flex R: 62/45  L: 72/65   L: 75/55 R  80/60  L 80/67  75/67  86/67   Rad/Uln dev ROM R:  L:        Forearm ROM  Sup/pron R:75/90  L: 85/90   L :90/90      Elbow ROM Ext/flex WFL:        Shoulder Flex  Shoulder Abd  Shoulder IR/ER          Edema in cm circumf. MCPJs R:  L:        Edema in cm circumf. Wrist R:  L:         strength in lbs NT    R - 50  L - 42    Pinch Strengthin lbs: lat  R:  L:        Pinch Strength in lbs:  3 point R:  L:           MMT:           Observations:  (including splints, bandages, incisions, scars): Steri strips still present R wrist     L wrist still in cast                  At this time tx is on R wrist only  MODALITIES: 6/9/21 6/16/21 6/23/21 6/30/21 7/7/21 7/19/21 7/28/21   Fluidotherapy (42688) 15'         Estim (00846/27427)       INF + HP forearm, wrist   Paraffin (46122)          US (99917)    US at 50% 1.5 for 8' L wrist  8'    Iontophoresis (31521)          Hot Pack  10' 10'  10' to precondition tissues  15' at neck   Cold Pack                    INTERVENTIONS:          Pt educ Added L wrist AROM and gentle PROM to HEP A/PROM R and L forearm, wrist,  AROM R and L forearm, wrist,  A/PROM  R and L forearm and wrist Hammer rotation with 8 ounces. 2 lb.  Wrist curls bilaterally wrist flex, ext and RD               Power web          putty  Yellow  Rolling, shaping; pinching; mallet Yellow  Rolling, shaping; pinching; mallet Yellow  Rolling, shaping; pinching; mallet  Yellow  Rolling, shaping; pinching; mallet       Red flex bar wrist flex/ext Red flex bar wrist flex/ext Red flexbar individual motions of wrist flex and ext both wrist, combined wrist flex and ext bilaterally and bilateral bending up and down. Red flex bar wrist flex/ext      digiflex yellow R and L x 25   Wristciser bilateral opposite wrist motions. Pulleys 5'        Adjustable hand gripper gold spring second x 40 on both hands. Finisher table to various movement patterns                                                               Manual Therapy (41927)  STM, gentle PROM; scar massage PROM, STM,  SASTM; scar massage PROM, STM,  SASTM; scar massage DTM bilateral forearms with passive wrist stretching in all directions STM,PROM  STM,PROM    (IASTM, Dry Needling, manual mobilization)                    Splinting    kinesiotape at R and L wrists for pain and edema mgt Wearing tubigrip as needed     Lcode: Custom wrist/hand static splint          Orthotic Mgmt, Subsequent Enc (19712)      Fabricated neoprene wrist support to inhibit DRUJ pain    Orthotic Mgmt & Training (27697)                    Other:                      Therapeutic Exercise & NMR:  [] (96657) Provided verbal/tactile cueing for activities related to strengthening, flexibility, endurance, ROM  for improvements in scapular, scapulothoracic and UE control with self care, reaching, carrying, lifting, house/yardwork, driving/computer work.    [] (67466) Provided verbal/tactile cueing for activities related to improving balance, coordination, kinesthetic sense, posture, motor skill, proprioception  to assist with  scapular, scapulothoracic and UE control with self care, reaching, carrying, lifting, house/yardwork, driving/computer work.     Therapeutic Activities & NMR:    [] (00453 or 22336) Provided verbal/tactile cueing for activities related to improving balance, coordination, kinesthetic sense, posture, motor skill, proprioception and motor activation to allow for proper function of scapular, scapulothoracic and UE control with self care, carrying, lifting, driving/computer work    Home Exercise Program:    [] (47529) Reviewed/Progressed HEP activities related to strengthening, flexibility, endurance, ROM of scapular, scapulothoracic and UE control with self care, reaching, carrying, lifting, house/yardwork, driving/computer work  [] (50457) Reviewed/Progressed HEP activities related to improving balance, coordination, kinesthetic sense, posture, motor skill, proprioception of scapular, scapulothoracic and UE control with self care, reaching, carrying, lifting, house/yardwork, driving/computer work      Manual Treatments:  PROM / STM / Oscillations-Mobs:  G-I, II, III, IV (PA's, Inf., Post.)  [] (65208) Provided manual therapy to mobilize soft tissue/joints of cervical/CT, scapular GHJ and UE for the purpose of modulating pain, promoting relaxation,  increasing ROM, reducing/eliminating soft tissue swelling/inflammation/restriction, improving soft tissue extensibility and allowing for proper ROM for normal function with self care, reaching, carrying, lifting, house/yardwork, driving/computer work    ADL Training:  [] (35260) Provided self-care/home management training related to activities of daily living and compensatory training, and/or use of adaptive equipment      Charges:  Timed Code Treatment Minutes: 45'   Total Treatment Minutes: 61'   Worker's Comp: Time In/Time Out     [] EVAL (LOW) 46890 (typically 20 minutes face-to-face)    [] EVAL (MOD) 20740 (typically 30 minutes face-to-face)  [] EVAL (HIGH) [phone] (typically 45 minutes face-to-face)  [] OT Re-eval (91172)       [x] Alona ([address]) x 1     [] PJVVI(40586)  [x] NMR (88650) x  1    [] Estim (attended) (58328)   [x] Manual ([facility]) x   1   [] US (76847)  [] TA (67763) x 1      [] Paraffin (26657)  [] ADL  ([facility]) x     [] Splint/L code:  custom wrist/hand static spint   [x] Estim (unattended) ([id number])  [] Fluidotherapy (90461)  [] Other: Orthotic Mgmt, Subsequent Enc (99276)    Comorbidities Affecting Functional Performance:     []Anxiety (F41.9)/Depression (F32.9)   []Diabetes Type 1(E10.65) or 2 (E11.65)   []Rheumatoid Arthritis (M05.9)  []Fibromyalgia (M79.7)  []Neuropathy(G60.9)  []Osteoarthritis(M19.91)  []None   []Other:    ASSESSMENT:  Pt reports some relief from overall pain by end of session. GOALS:  Patient stated goal: To have full function of both UEs   []? Progressing: []? Met: []? Not Met: []? Adjusted     Therapist goals for Patient:   Short Term Goals: To be achieved in: 2 weeks  1. Independent in HEP and progression per patient tolerance, in order to prevent re-injury. []? Progressing: [x]? Met: []? Not Met: []? Adjusted   2. Patient will have a decrease in pain to facilitate improvement in movement, function, and ADLs as indicated by Functional Deficits. []? Progressing: [x]? Met: []? Not Met: []? Adjusted     Long Term Goals to be achieved in 12 weeks (through 8/19/21), including patient directed goals to address patient identified performance deficits:  1) Pt to be independent in graded HEP progression with a good level of effort and compliance. []? Progressing: [x]? Met: []? Not Met: []? Adjusted   2) Pt to report a score of </= 30 % on the Quick DASH disability questionnaire for increased performance with carrying, moving, and handling objects. [x]? Progressing: []? Met: []? Not Met: []? Adjusted   3) Pt will demonstrate increased ROM to [facility] for improved independence with self care, home mgt, leisure activities, driving and sleeping.  []? Progressing: [x]? Met: []? Not Met: []? Adjusted   4) Pt will demonstrate increased strength to at least 25# of  strength in both hands for improved independence with  self care, home mgt, leisure activities, driving and sleeping.  []? Progressing: [x]? Met: []? Not Met: []?  Adjusted   5) Pt will have a decrease in pain to 2/10 to facilitate  self care, home mgt, leisure activities, driving and sleeping. [x]? Progressing: []? Met: []? Not Met: []? Adjusted    Overall Progression Towards Functional Goals/Treatment Progress Update:  [x] Patient is progressing as expected towards functional goals listed. [] Progression is slowed due to complexities/impairments listed. [] Progression has been slowed due to co-morbidities. [] Plan just implemented, too soon to assess goals progression <30 days  [] Goals require adjustment due to lack of progress  [] Patient is not progressing as expected and requires additional follow up with physician  [] All goals are met  [] Other:     Prognosis for POC: [x] Good [] Fair  [] Poor    Patient requires continued skilled intervention: [x] Yes  [] No    Treatment/Activity Tolerance:  [x] Patient able to complete treatment  [] Patient limited by fatigue  [] Patient limited by pain    [] Patient limited by other medical complications  [] Other:                  PLAN: See eval  [x] Continue per plan of care [] Alter current plan (see comments above)  [] Plan of care initiated [] Hold pending MD visit [] Discharge      Electronically signed by:  Paul Smith OT/L [id number]    Note: If patient does not return for scheduled/ recommended follow up visits, this note will serve as a discharge from care along with most recent update on progress.   Phone: [phone]  Fax [phone]

## 2021-08-04 ENCOUNTER — HOSPITAL ENCOUNTER (OUTPATIENT)
Dept: OCCUPATIONAL THERAPY | Age: 66
Setting detail: THERAPIES SERIES
Discharge: HOME | End: 2021-08-04
Payer: MEDICARE

## 2021-08-04 PROCEDURE — 97112 NEUROMUSCULAR REEDUCATION: CPT | Performed by: OCCUPATIONAL THERAPIST

## 2021-08-04 PROCEDURE — 97140 MANUAL THERAPY 1/> REGIONS: CPT | Performed by: OCCUPATIONAL THERAPIST

## 2021-08-04 PROCEDURE — 97110 THERAPEUTIC EXERCISES: CPT | Performed by: OCCUPATIONAL THERAPIST

## 2021-08-04 PROCEDURE — G0283 ELEC STIM OTHER THAN WOUND: HCPCS | Performed by: OCCUPATIONAL THERAPIST

## 2021-08-04 NOTE — FLOWSHEET NOTE
[facility], [address]    Occupational Therapy Treatment Note/ Progress Report:     Is this a Progress Report:     [x]  Yes  []  No      If Yes:  Date Range for reporting period:  Beginning 21    Ending    Progress report will be due (10 Rx or 30 days whichever is KVKK):    Recertification will be due (POC Duration  / 90 days whichever is less):21  Patient: Sultana Marinelli                                    : 1955                                  MRN: [id number]  Referring Physician:  Martha Rowland                                            Evaluation Date: 2021                                           Medical Diagnosis Information:  Diagnosis: R distal radius fx  W73.217Y    Treatment Dx: Miladys Siobhan wrist painM25. 936                                Date of Injury: 21  Date of Surgery: 21  R wrist ORIF                                Insurance information:   /[facility]     Date of Patient follow up with Physician:4 wksHas the plan of care been signed (Y/N):        []  Yes  [x]  No       Visit # Insurance Allowable Auth Required   12 BMN []  Yes []  No    From 21  to 2021      Preferred Language for Healthcare:   [x]English       []other:     RESTRICTIONS/PRECAUTIONS: Pt has cast L wrist. R wrist to follow distal radius fx protocol for ORIF     Latex Allergy:  []? No      []? Yes                    Pacemaker:  []? No       []? Yes         Functional Scale: 98% (Quick DASH)                                 Date assessed:  2021     SUBJECTIVE: Pt reports she still has stiffness and soreness at neck and L wrist.      Patient reported deficits/history of current problem: Pt tripped over block in sidewalk     Pain Scale 6/10                      1/10 R            []? Constant                [x]? Intermittent              []?other:  Pain Location:  wrist  Easing factors:ice  Provocative factors: AROM      OBJECTIVE: At this time tx is on R wrist only  Date:  Hand Dominance:     []? Right    [x]? Left 5/19/2021 6/2/21 6/9/21 6/16/21 7/7/21 7/19/21   Objective Measures:          PAIN 4/10        Quick DASH 98%     43%   Digits tip to DPFC in cm WFL        Thumb ROM WFL        Thumb opposition  WFL        Thumb Radial/Palmar abd ROM R:  L:        Wrist ROM Ext/Flex R: 62/45  L: 72/65   L: 75/55 R  80/60  L 80/67  75/67  86/67   Rad/Uln dev ROM R:  L:        Forearm ROM  Sup/pron R:75/90  L: 85/90   L :90/90      Elbow ROM Ext/flex WFL:        Shoulder Flex  Shoulder Abd  Shoulder IR/ER          Edema in cm circumf. MCPJs R:  L:        Edema in cm circumf. Wrist R:  L:         strength in lbs NT    R - 50  L - 42    Pinch Strengthin lbs: lat  R:  L:        Pinch Strength in lbs:  3 point R:  L:           MMT:           Observations:  (including splints, bandages, incisions, scars): Steri strips still present R wrist     L wrist still in cast                  At this time tx is on R wrist only  MODALITIES: 7/7/21 7/19/21 7/28/21 8/4/21   Fluidotherapy (87243)       Estim (89107/66231)   INF + HP forearm, wrist    INF + HP forearm, wrist      Paraffin (84530)       US (48396)  8'     Iontophoresis (12809)       Hot Pack 10' to precondition tissues  15' at neck 15' at neck   Cold Pack              INTERVENTIONS:       Pt educ Hammer rotation with 8 ounces. 2 lb. Wrist curls bilaterally wrist flex, ext and RD             Power web       putty  Yellow  Rolling, shaping; pinching; mallet Yellow  Rolling, shaping; pinching; mallet Yellow  Rolling, shaping; pinching; mallet    Red flexbar individual motions of wrist flex and ext both wrist, combined wrist flex and ext bilaterally and bilateral bending up and down. R and L  Red flex bar wrist flex/ext  R and L   Red flex bar  Wrist and forearm 2 x 25    Wristciser bilateral opposite wrist motions.   Pulleys 5' R and L digiflex red x 25    Adjustable hand gripper gold spring second x 40 on both hands. Finisher table to various movement patterns                                              Manual Therapy (37429) DTM bilateral forearms with passive wrist stretching in all directions STM,PROM  STM,PROM  STM,PROM    (IASTM, Dry Needling, manual mobilization)              Splinting Wearing tubigrip as needed      Lcode:       Orthotic Mgmt, Subsequent Enc (58243)  Fabricated neoprene wrist support to inhibit DRUJ pain     Orthotic Mgmt & Training (98760)              Other:                Therapeutic Exercise & NMR:  [] (31211) Provided verbal/tactile cueing for activities related to strengthening, flexibility, endurance, ROM  for improvements in scapular, scapulothoracic and UE control with self care, reaching, carrying, lifting, house/yardwork, driving/computer work.    [] (69140) Provided verbal/tactile cueing for activities related to improving balance, coordination, kinesthetic sense, posture, motor skill, proprioception  to assist with  scapular, scapulothoracic and UE control with self care, reaching, carrying, lifting, house/yardwork, driving/computer work.     Therapeutic Activities & NMR:    [] (23081 or 54464) Provided verbal/tactile cueing for activities related to improving balance, coordination, kinesthetic sense, posture, motor skill, proprioception and motor activation to allow for proper function of scapular, scapulothoracic and UE control with self care, carrying, lifting, driving/computer work    Home Exercise Program:    [] (07863) Reviewed/Progressed HEP activities related to strengthening, flexibility, endurance, ROM of scapular, scapulothoracic and UE control with self care, reaching, carrying, lifting, house/yardwork, driving/computer work  [] (14106) Reviewed/Progressed HEP activities related to improving balance, coordination, kinesthetic sense, posture, motor skill, proprioception of scapular, scapulothoracic and UE control with self care, reaching, carrying, lifting, house/yardwork, driving/computer work      Manual Treatments:  PROM / STM / Oscillations-Mobs:  G-I, II, III, IV (PA's, Inf., Post.)  [] (07279) Provided manual therapy to mobilize soft tissue/joints of cervical/CT, scapular GHJ and UE for the purpose of modulating pain, promoting relaxation,  increasing ROM, reducing/eliminating soft tissue swelling/inflammation/restriction, improving soft tissue extensibility and allowing for proper ROM for normal function with self care, reaching, carrying, lifting, house/yardwork, driving/computer work    ADL Training:  [] (24416) Provided self-care/home management training related to activities of daily living and compensatory training, and/or use of adaptive equipment      Charges:  Timed Code Treatment Minutes: 39'   Total Treatment Minutes: 61'   Worker's Comp: Time In/Time Out     [] EVAL (LOW) 73496 (typically 20 minutes face-to-face)    [] EVAL (MOD) 34754 (typically 30 minutes face-to-face)  [] EVAL (HIGH) [id number] (typically 45 minutes face-to-face)  [] OT Re-eval (16859)       [x] Alona ([id number]) x 1     [] DJPIV(54395)  [x] NMR (99405) x  1    [] Estim (attended) (48980)   [x] Manual ([facility]) x   1   [] US (29456)  [] TA (71118) x 1      [] Paraffin (08274)  [] ADL  (13 [phone] 24 60) x     [] Splint/L code:  custom wrist/hand static spint   [x] Estim (unattended) (95187)  [] Fluidotherapy (55730)  [] Other: Orthotic Mgmt, Subsequent Enc (28532)    Comorbidities Affecting Functional Performance:     []Anxiety (F41.9)/Depression (F32.9)   []Diabetes Type 1(E10.65) or 2 (E11.65)   []Rheumatoid Arthritis (M05.9)  []Fibromyalgia (M79.7)  []Neuropathy(G60.9)  []Osteoarthritis(M19.91)  []None   []Other:    ASSESSMENT:  Pt reports some relief from overall pain by end of session. GOALS:  Patient stated goal: To have full function of both UEs   []? Progressing: []? Met: []? Not Met: []? Adjusted     Therapist goals for Patient:   Short Term Goals:  To be achieved in: 2 weeks  1. Independent in HEP and progression per patient tolerance, in order to prevent re-injury. []? Progressing: [x]? Met: []? Not Met: []? Adjusted   2. Patient will have a decrease in pain to facilitate improvement in movement, function, and ADLs as indicated by Functional Deficits. []? Progressing: [x]? Met: []? Not Met: []? Adjusted     Long Term Goals to be achieved in 12 weeks (through 8/19/21), including patient directed goals to address patient identified performance deficits:  1) Pt to be independent in graded HEP progression with a good level of effort and compliance. []? Progressing: [x]? Met: []? Not Met: []? Adjusted   2) Pt to report a score of </= 30 % on the Quick DASH disability questionnaire for increased performance with carrying, moving, and handling objects. [x]? Progressing: []? Met: []? Not Met: []? Adjusted   3) Pt will demonstrate increased ROM to [facility] for improved independence with self care, home mgt, leisure activities, driving and sleeping.  []? Progressing: [x]? Met: []? Not Met: []? Adjusted   4) Pt will demonstrate increased strength to at least 25# of  strength in both hands for improved independence with  self care, home mgt, leisure activities, driving and sleeping.  []? Progressing: [x]? Met: []? Not Met: []? Adjusted   5) Pt will have a decrease in pain to 2/10 to facilitate  self care, home mgt, leisure activities, driving and sleeping. [x]? Progressing: []? Met: []? Not Met: []? Adjusted    Overall Progression Towards Functional Goals/Treatment Progress Update:  [x] Patient is progressing as expected towards functional goals listed. [] Progression is slowed due to complexities/impairments listed. [] Progression has been slowed due to co-morbidities.   [] Plan just implemented, too soon to assess goals progression <30 days  [] Goals require adjustment due to lack of progress  [] Patient is not progressing as expected and requires additional follow up with physician  [] All goals are met  [] Other:     Prognosis for POC: [x] Good [] Fair  [] Poor    Patient requires continued skilled intervention: [x] Yes  [] No    Treatment/Activity Tolerance:  [x] Patient able to complete treatment  [] Patient limited by fatigue  [] Patient limited by pain    [] Patient limited by other medical complications  [] Other:                  PLAN: See eval  [x] Continue per plan of care [] Alter current plan (see comments above)  [] Plan of care initiated [] Hold pending MD visit [] Discharge      Electronically signed by:  Seth Nguyen OT/L [id number]    Note: If patient does not return for scheduled/ recommended follow up visits, this note will serve as a discharge from care along with most recent update on progress.   Phone: [phone]  Fax [phone]

## 2021-08-09 ENCOUNTER — HOSPITAL ENCOUNTER (OUTPATIENT)
Dept: OCCUPATIONAL THERAPY | Age: 66
Setting detail: THERAPIES SERIES
Discharge: HOME | End: 2021-08-09
Payer: MEDICARE

## 2021-08-09 PROCEDURE — 97110 THERAPEUTIC EXERCISES: CPT | Performed by: OCCUPATIONAL THERAPIST

## 2021-08-09 PROCEDURE — 97112 NEUROMUSCULAR REEDUCATION: CPT | Performed by: OCCUPATIONAL THERAPIST

## 2021-08-09 PROCEDURE — 97140 MANUAL THERAPY 1/> REGIONS: CPT | Performed by: OCCUPATIONAL THERAPIST

## 2021-08-09 PROCEDURE — G0283 ELEC STIM OTHER THAN WOUND: HCPCS | Performed by: OCCUPATIONAL THERAPIST

## 2021-08-09 NOTE — FLOWSHEET NOTE
[facility], [address], [address] [place] [address]    Occupational Therapy Treatment Note/ Progress Report:     Is this a Progress Report:     [x]  Yes  []  No      If Yes:  Date Range for reporting period:  Beginning 21    Ending    Progress report will be due (10 Rx or 30 days whichever is FTDJ):58    Recertification will be due (POC Duration  / 90 days whichever is less):21  Patient: Renea Clement                                    : 1955                                  MRN: [id number]  Referring Physician:  Raysa Holliday                                            Evaluation Date: 2021                                           Medical Diagnosis Information:  Diagnosis: R distal radius fx  N09.912Q    Treatment Dx: Kevin Pisgah wrist painM25. 816                                Date of Injury: 21  Date of Surgery: 21  R wrist ORIF                                Insurance information:   /[facility]     Date of Patient follow up with Physician:4 wksHas the plan of care been signed (Y/N):        []  Yes  [x]  No       Visit # Insurance Allowable Auth Required   13 BMN []  Yes []  No    From 21  to 2021      Preferred Language for Healthcare:   [x]English       []other:     RESTRICTIONS/PRECAUTIONS: Pt has cast L wrist. R wrist to follow distal radius fx protocol for ORIF     Latex Allergy:  []? No      []? Yes                    Pacemaker:  []? No       []? Yes         Functional Scale: 98% (Quick DASH)                                 Date assessed:  2021     SUBJECTIVE: Pt reports she still has stiffness and soreness at neck and L wrist.      Patient reported deficits/history of current problem: Pt tripped over block in sidewalk     Pain Scale 6/10                      10 R            []? Constant                [x]? Intermittent              []?other:  Pain Location:  wrist  Easing factors:ice  Provocative factors: AROM      OBJECTIVE: At this time tx is on R wrist only  Date:  Hand Dominance:     []? Right    [x]? Left 5/19/2021 6/2/21 6/9/21 6/16/21 7/7/21 7/19/21   Objective Measures:          PAIN 4/10        Quick DASH 98%     43%   Digits tip to DPFC in cm WFL        Thumb ROM WFL        Thumb opposition  WFL        Thumb Radial/Palmar abd ROM R:  L:        Wrist ROM Ext/Flex R: 62/45  L: 72/65   L: 75/55 R  80/60  L 80/67  75/67  86/67   Rad/Uln dev ROM R:  L:        Forearm ROM  Sup/pron R:75/90  L: 85/90   L :90/90      Elbow ROM Ext/flex WFL:        Shoulder Flex  Shoulder Abd  Shoulder IR/ER          Edema in cm circumf. MCPJs R:  L:        Edema in cm circumf. Wrist R:  L:         strength in lbs NT    R - 50  L - 42    Pinch Strengthin lbs: lat  R:  L:        Pinch Strength in lbs:  3 point R:  L:           MMT:           Observations:  (including splints, bandages, incisions, scars): Steri strips still present R wrist     L wrist still in cast                  At this time tx is on R wrist only  MODALITIES: 7/7/21 7/19/21 7/28/21 8/4/21 8/9/21   Fluidotherapy (22804)        Estim (69396/27010)   INF + HP forearm, wrist    INF + HP forearm, wrist    15'   Paraffin (12369)        US (95695)  8'      Iontophoresis (35716)        Hot Pack 10' to precondition tissues  15' at neck 15' at neck 15' at neck   Cold Pack                INTERVENTIONS:        Pt educ Hammer rotation with 8 ounces. 2 lb. Wrist curls bilaterally wrist flex, ext and RD               Power web        putty  Yellow  Rolling, shaping; pinching; mallet Yellow  Rolling, shaping; pinching; mallet Yellow  Rolling, shaping; pinching; mallet Yellow  Rolling, shaping; pinching; mallet    Red flexbar individual motions of wrist flex and ext both wrist, combined wrist flex and ext bilaterally and bilateral bending up and down.  R and L  Red flex bar wrist flex/ext  R and L   Red flex bar  Wrist and forearm 2 x 25 R and L   Red flex bar  Wrist and forearm 2 x 25    Wristciser bilateral opposite wrist motions. Pulleys 5' R and L digiflex red x 25     Adjustable hand gripper gold spring second x 40 on both hands. Finisher table to various movement patterns                                                     Manual Therapy (14620) DTM bilateral forearms with passive wrist stretching in all directions STM,PROM  STM,PROM  STM,PROM  STM,PROM    (IASTM, Dry Needling, manual mobilization)                Splinting Wearing tubigrip as needed       Lcode:        Orthotic Mgmt, Subsequent Enc (31590)  Fabricated neoprene wrist support to inhibit DRUJ pain      Orthotic Mgmt & Training (74017)                Other:                  Therapeutic Exercise & NMR:  [] (47465) Provided verbal/tactile cueing for activities related to strengthening, flexibility, endurance, ROM  for improvements in scapular, scapulothoracic and UE control with self care, reaching, carrying, lifting, house/yardwork, driving/computer work.    [] (87286) Provided verbal/tactile cueing for activities related to improving balance, coordination, kinesthetic sense, posture, motor skill, proprioception  to assist with  scapular, scapulothoracic and UE control with self care, reaching, carrying, lifting, house/yardwork, driving/computer work.     Therapeutic Activities & NMR:    [] (59665 or 47279) Provided verbal/tactile cueing for activities related to improving balance, coordination, kinesthetic sense, posture, motor skill, proprioception and motor activation to allow for proper function of scapular, scapulothoracic and UE control with self care, carrying, lifting, driving/computer work    Home Exercise Program:    [] (38023) Reviewed/Progressed HEP activities related to strengthening, flexibility, endurance, ROM of scapular, scapulothoracic and UE control with self care, reaching, carrying, lifting, house/yardwork, driving/computer work  [] (95905) Reviewed/Progressed HEP activities related to improving balance, coordination, kinesthetic sense, posture, motor skill, proprioception of scapular, scapulothoracic and UE control with self care, reaching, carrying, lifting, house/yardwork, driving/computer work      Manual Treatments:  PROM / STM / Oscillations-Mobs:  G-I, II, III, IV (PA's, Inf., Post.)  [] (97754) Provided manual therapy to mobilize soft tissue/joints of cervical/CT, scapular GHJ and UE for the purpose of modulating pain, promoting relaxation,  increasing ROM, reducing/eliminating soft tissue swelling/inflammation/restriction, improving soft tissue extensibility and allowing for proper ROM for normal function with self care, reaching, carrying, lifting, house/yardwork, driving/computer work    ADL Training:  [] (77319) Provided self-care/home management training related to activities of daily living and compensatory training, and/or use of adaptive equipment      Charges:  Timed Code Treatment Minutes: 35''   Total Treatment Minutes: 50'   Worker's Comp: Time In/Time Out     [] EVAL (LOW) 71851 (typically 20 minutes face-to-face)    [] EVAL (MOD) 09994 (typically 30 minutes face-to-face)  [] EVAL (HIGH) 06311 (typically 45 minutes face-to-face)  [] OT Re-eval (17816)       [x] Alona [phone] x 1     [] YHOCC(68028)  [x] NMR (85511) x  1    [] Estim (attended) (86091)   [x] Manual ([id number]) x   1   [] US (07428)  [] TA (53998) x 1      [] Paraffin (41681)  [] ADL  (41170) x     [] Splint/L code:  custom wrist/hand static spint   [x] Estim (unattended) (74069)  [] Fluidotherapy (96391)  [] Other: Orthotic Mgmt, Subsequent Enc (81130)    Comorbidities Affecting Functional Performance:     []Anxiety (F41.9)/Depression (F32.9)   []Diabetes Type 1(E10.65) or 2 (E11.65)   []Rheumatoid Arthritis (M05.9)  []Fibromyalgia (M79.7)  []Neuropathy(G60.9)  []Osteoarthritis(M19.91)  []None   []Other:    ASSESSMENT:  Pt reports some relief from overall pain by end of session.   GOALS:  Patient stated goal: To have full function of both UEs   []? Progressing: []? Met: []? Not Met: []? Adjusted     Therapist goals for Patient:   Short Term Goals: To be achieved in: 2 weeks  1. Independent in HEP and progression per patient tolerance, in order to prevent re-injury. []? Progressing: [x]? Met: []? Not Met: []? Adjusted   2. Patient will have a decrease in pain to facilitate improvement in movement, function, and ADLs as indicated by Functional Deficits. []? Progressing: [x]? Met: []? Not Met: []? Adjusted     Long Term Goals to be achieved in 12 weeks (through 8/19/21), including patient directed goals to address patient identified performance deficits:  1) Pt to be independent in graded HEP progression with a good level of effort and compliance. []? Progressing: [x]? Met: []? Not Met: []? Adjusted   2) Pt to report a score of </= 30 % on the Quick DASH disability questionnaire for increased performance with carrying, moving, and handling objects. [x]? Progressing: []? Met: []? Not Met: []? Adjusted   3) Pt will demonstrate increased ROM to [facility] for improved independence with self care, home mgt, leisure activities, driving and sleeping.  []? Progressing: [x]? Met: []? Not Met: []? Adjusted   4) Pt will demonstrate increased strength to at least 25# of  strength in both hands for improved independence with  self care, home mgt, leisure activities, driving and sleeping.  []? Progressing: [x]? Met: []? Not Met: []? Adjusted   5) Pt will have a decrease in pain to 2/10 to facilitate  self care, home mgt, leisure activities, driving and sleeping. [x]? Progressing: []? Met: []? Not Met: []? Adjusted    Overall Progression Towards Functional Goals/Treatment Progress Update:  [x] Patient is progressing as expected towards functional goals listed. [] Progression is slowed due to complexities/impairments listed. [] Progression has been slowed due to co-morbidities.   [] Plan just implemented, too soon to assess goals

## 2021-08-11 ENCOUNTER — APPOINTMENT (OUTPATIENT)
Dept: OCCUPATIONAL THERAPY | Age: 66
End: 2021-08-11
Payer: MEDICARE

## 2021-08-16 ENCOUNTER — HOSPITAL ENCOUNTER (OUTPATIENT)
Dept: OCCUPATIONAL THERAPY | Age: 66
Setting detail: THERAPIES SERIES
Discharge: HOME | End: 2021-08-16
Payer: MEDICARE

## 2021-08-16 NOTE — FLOWSHEET NOTE
[facility], [address]    Occupational Therapy  Cancellation/No-show Note  Patient Name:  Amber Olivera   :  1955   Date:  2021  Cancelled visits to date: 0  No-shows to date: 1    For today's appointment patient:  []    Cancelled  []    Rescheduled appointment  [x]    No-show     Reason given by patient:  []    Patient ill  []    Conflicting appointment  []    No transportation    []    Conflict with work  []    No reason given  []    Other:     Comments:      Electronically signed by:  Barbara Marmolejo, OT/L [id number]

## 2021-08-18 ENCOUNTER — APPOINTMENT (OUTPATIENT)
Dept: OCCUPATIONAL THERAPY | Age: 66
End: 2021-08-18
Payer: MEDICARE

## 2021-08-23 ENCOUNTER — HOSPITAL ENCOUNTER (OUTPATIENT)
Dept: OCCUPATIONAL THERAPY | Age: 66
Setting detail: THERAPIES SERIES
Discharge: HOME | End: 2021-08-23
Payer: MEDICARE

## 2021-08-23 NOTE — FLOWSHEET NOTE
[facility], [address], [address] [place] [address]    Occupational Therapy  Cancellation/No-show Note  Patient Name:  Radha Pisano   :  1955   Date:  2021  Cancelled visits to date: 1  No-shows to date: 1    For today's appointment patient:  [x]    Cancelled  []    Rescheduled appointment  []    No-show     Reason given by patient:  []    Patient ill  []    Conflicting appointment  []    No transportation    []    Conflict with work  []    No reason given  [x]    Other:  Pt stuck in [place] due to [place]   Comments:      Electronically signed by:  Seth Nguyen OT/L [id number]

## 2021-08-30 ENCOUNTER — HOSPITAL ENCOUNTER (OUTPATIENT)
Dept: OCCUPATIONAL THERAPY | Age: 66
Setting detail: THERAPIES SERIES
End: 2021-08-30
Payer: MEDICARE

## 2021-09-08 ENCOUNTER — HOSPITAL ENCOUNTER (OUTPATIENT)
Dept: OCCUPATIONAL THERAPY | Age: 66
Setting detail: THERAPIES SERIES
Discharge: HOME | End: 2021-09-08
Payer: MEDICARE

## 2021-09-08 PROCEDURE — 97140 MANUAL THERAPY 1/> REGIONS: CPT | Performed by: OCCUPATIONAL THERAPIST

## 2021-09-08 PROCEDURE — 97110 THERAPEUTIC EXERCISES: CPT | Performed by: OCCUPATIONAL THERAPIST

## 2021-09-08 PROCEDURE — 97112 NEUROMUSCULAR REEDUCATION: CPT | Performed by: OCCUPATIONAL THERAPIST

## 2021-09-08 NOTE — FLOWSHEET NOTE
[facility], [address]    Occupational Therapy Treatment Note/ Progress Report:     Is this a Progress Report:     [x]  Yes  []  No      If Yes:  Date Range for reporting period:  Beginning 21    Ending    Progress report will be due (10 Rx or 30 days whichever is FDHD):    Recertification will be due (POC Duration  / 90 days whichever is less):21  Patient: Kuldip Masters                                    : 1955                                  MRN: [id number]  Referring Physician:  Rica Zhu                                            Evaluation Date: 2021                                           Medical Diagnosis Information:  Diagnosis: R distal radius fx  G61.956L    Treatment Dx: Enid Mews wrist painM25. 674                                Date of Injury: 21  Date of Surgery: 21  R wrist ORIF                                Insurance information:   /[facility]     Date of Patient follow up with Physician:4 wksHas the plan of care been signed (Y/N):        []  Yes  [x]  No       Visit # Insurance Allowable Auth Required   14 BMN []  Yes []  No    From 21  to 2021      Preferred Language for Healthcare:   [x]English       []other:     RESTRICTIONS/PRECAUTIONS: Pt has cast L wrist. R wrist to follow distal radius fx protocol for ORIF     Latex Allergy:  []? No      []? Yes                    Pacemaker:  []? No       []? Yes         Functional Scale: 98% (Quick DASH)                                 Date assessed:  2021     SUBJECTIVE: Pt reports she has been busy traveling. Biggest concern at this point is strength and achey pain especially in L Wrist.     Patient reported deficits/history of current problem: Pt tripped over block in sidewalk     Pain Scale 6/10                      1/10 R            []? Constant                [x]? Intermittent              []?other:  Pain Location: wrist  Easing factors:ice  Provocative factors: AROM      OBJECTIVE: At this time tx is on R wrist only  Date:  Hand Dominance:     []? Right    [x]? Left 5/19/2021 6/2/21 6/9/21 6/16/21 7/7/21 7/19/21 9/8/21   Objective Measures:           PAIN 4/10      R 2/10 with use  L 4/10 with use   Quick DASH 98%     43%    Digits tip to DPFC in cm WFL         Thumb ROM WFL         Thumb opposition  WFL         Thumb Radial/Palmar abd ROM R:  L:         Wrist ROM Ext/Flex R: 62/45  L: 72/65   L: 75/55 R  80/60  L 80/67  75/67  86/67 81/70  86/70   Rad/Uln dev ROM R:  L:         Forearm ROM  Sup/pron R:75/90  L: 85/90   L :90/90       Elbow ROM Ext/flex WFL:         Shoulder Flex  Shoulder Abd  Shoulder IR/ER           Edema in cm circumf. MCPJs R:  L:         Edema in cm circumf. Wrist R:  L:          strength in lbs NT    R - 50  L - 42  R48#  L 49#   Pinch Strengthin lbs: lat  R:  L:         Pinch Strength in lbs:  3 point R:  L:            MMT:            Observations:  (including splints, bandages, incisions, scars): Steri strips still present R wrist     L wrist still in cast                   At this time tx is on R wrist only  MODALITIES: 7/7/21 7/19/21 7/28/21 8/4/21 8/9/21 9/8/21   Fluidotherapy (41733)         Estim (09107/31807)   INF + HP forearm, wrist    INF + HP forearm, wrist    15'    Paraffin (20423)         US (00167)  8'       Iontophoresis (14968)         Hot Pack 10' to precondition tissues  15' at neck 15' at neck 15' at neck HP 10'   Cold Pack                  INTERVENTIONS:         Pt educ Hammer rotation with 8 ounces. 2 lb.  Wrist curls bilaterally wrist flex, ext and RD                 Power web         putty  Yellow  Rolling, shaping; pinching; mallet Yellow  Rolling, shaping; pinching; mallet Yellow  Rolling, shaping; pinching; mallet Yellow  Rolling, shaping; pinching; mallet Yellow  Rolling, shaping; pinching; mallet    Red flexbar individual motions of wrist flex and ext both lifting, driving/computer work    Home Exercise Program:    [] (94498) Reviewed/Progressed HEP activities related to strengthening, flexibility, endurance, ROM of scapular, scapulothoracic and UE control with self care, reaching, carrying, lifting, house/yardwork, driving/computer work  [] (20511) Reviewed/Progressed HEP activities related to improving balance, coordination, kinesthetic sense, posture, motor skill, proprioception of scapular, scapulothoracic and UE control with self care, reaching, carrying, lifting, house/yardwork, driving/computer work      Manual Treatments:  PROM / STM / Oscillations-Mobs:  G-I, II, III, IV (PA's, Inf., Post.)  [] ([id number]) Provided manual therapy to mobilize soft tissue/joints of cervical/CT, scapular GHJ and UE for the purpose of modulating pain, promoting relaxation,  increasing ROM, reducing/eliminating soft tissue swelling/inflammation/restriction, improving soft tissue extensibility and allowing for proper ROM for normal function with self care, reaching, carrying, lifting, house/yardwork, driving/computer work    ADL Training:  [] (88098) Provided self-care/home management training related to activities of daily living and compensatory training, and/or use of adaptive equipment      Charges:  Timed Code Treatment Minutes: 40''   Total Treatment Minutes: 50'   Worker's Comp: Time In/Time Out     [] EVAL (LOW) 72822 (typically 20 minutes face-to-face)    [] EVAL (MOD) 21838 (typically 30 minutes face-to-face)  [] EVAL (HIGH) [phone] (typically 45 minutes face-to-face)  [] OT Re-eval (40521)       [x] Alona [phone] x 1     [] UDGRI(49561)  [x] NMR (54422) x  1    [] Estim (attended) (14492)   [x] Manual ([id number]) x   1   [] US (75895)  [] TA (66643) x 1      [] Paraffin (61039)  [] ADL  ([phone]) x     [] Splint/L code:  custom wrist/hand static spint   [] Estim (unattended) (22 116564)  [] Fluidotherapy (42529)  [] Other: Orthotic Mgmt, Subsequent Enc (84230)    Comorbidities Affecting Functional Performance:     []Anxiety (F41.9)/Depression (F32.9)   []Diabetes Type 1(E10.65) or 2 (E11.65)   []Rheumatoid Arthritis (M05.9)  []Fibromyalgia (M79.7)  []Neuropathy(G60.9)  []Osteoarthritis(M19.91)  []None   []Other:    ASSESSMENT:  Pt making progress with L  strength   GOALS:  Patient stated goal: To have full function of both UEs   []? Progressing: []? Met: []? Not Met: []? Adjusted     Therapist goals for Patient:   Short Term Goals: To be achieved in: 2 weeks  1. Independent in HEP and progression per patient tolerance, in order to prevent re-injury. []? Progressing: [x]? Met: []? Not Met: []? Adjusted   2. Patient will have a decrease in pain to facilitate improvement in movement, function, and ADLs as indicated by Functional Deficits. []? Progressing: [x]? Met: []? Not Met: []? Adjusted     Long Term Goals to be achieved in 12 weeks (through 8/19/21), including patient directed goals to address patient identified performance deficits:  1) Pt to be independent in graded HEP progression with a good level of effort and compliance. []? Progressing: [x]? Met: []? Not Met: []? Adjusted   2) Pt to report a score of </= 30 % on the Quick DASH disability questionnaire for increased performance with carrying, moving, and handling objects. [x]? Progressing: []? Met: []? Not Met: []? Adjusted   3) Pt will demonstrate increased ROM to [facility] for improved independence with self care, home mgt, leisure activities, driving and sleeping.  []? Progressing: [x]? Met: []? Not Met: []? Adjusted   4) Pt will demonstrate increased strength to at least 25# of  strength in both hands for improved independence with  self care, home mgt, leisure activities, driving and sleeping.  []? Progressing: [x]? Met: []? Not Met: []? Adjusted   5) Pt will have a decrease in pain to 2/10 to facilitate  self care, home mgt, leisure activities, driving and sleeping. [x]? Progressing: []? Met: []?  Not Met: []? Adjusted    Overall Progression Towards Functional Goals/Treatment Progress Update:  [x] Patient is progressing as expected towards functional goals listed. [] Progression is slowed due to complexities/impairments listed. [] Progression has been slowed due to co-morbidities. [] Plan just implemented, too soon to assess goals progression <30 days  [] Goals require adjustment due to lack of progress  [] Patient is not progressing as expected and requires additional follow up with physician  [] All goals are met  [] Other:     Prognosis for POC: [x] Good [] Fair  [] Poor    Patient requires continued skilled intervention: [x] Yes  [] No    Treatment/Activity Tolerance:  [x] Patient able to complete treatment  [] Patient limited by fatigue  [] Patient limited by pain    [] Patient limited by other medical complications  [] Other:                  PLAN: See eval  [x] Continue per plan of care [] Alter current plan (see comments above)  [] Plan of care initiated [] Hold pending MD visit [] Discharge      Electronically signed by:  Liliane Ardon OT/L [id number]    Note: If patient does not return for scheduled/ recommended follow up visits, this note will serve as a discharge from care along with most recent update on progress.   Phone: [phone]  Fax [phone]

## 2021-09-13 ENCOUNTER — HOSPITAL ENCOUNTER (OUTPATIENT)
Dept: OCCUPATIONAL THERAPY | Age: 66
Setting detail: THERAPIES SERIES
End: 2021-09-13
Payer: MEDICARE

## 2021-09-20 ENCOUNTER — HOSPITAL ENCOUNTER (OUTPATIENT)
Dept: OCCUPATIONAL THERAPY | Age: 66
Setting detail: THERAPIES SERIES
Discharge: HOME | End: 2021-09-20
Payer: MEDICARE

## 2021-09-20 NOTE — FLOWSHEET NOTE
[facility], [address], [address] [place] [address]    Occupational Therapy  Cancellation/No-show Note  Patient Name:  Zachary Hines   :  1955   Date:  2021  Cancelled visits to date: 2  No-shows to date: 1    For today's appointment patient:  [x]    Cancelled  []    Rescheduled appointment  []    No-show     Reason given by patient:  []    Patient ill  []    Conflicting appointment  []    No transportation    []    Conflict with work  [x]    No reason given  []    Other:  Pt stuck in [place] due to [place]   Comments:      Electronically signed by:  Kalen Leonard, OTR/L [id number]

## 2021-09-27 ENCOUNTER — APPOINTMENT (OUTPATIENT)
Dept: OCCUPATIONAL THERAPY | Age: 66
End: 2021-09-27
Payer: MEDICARE

## 2021-10-06 ENCOUNTER — HOSPITAL ENCOUNTER (OUTPATIENT)
Dept: OCCUPATIONAL THERAPY | Age: 66
Setting detail: THERAPIES SERIES
Discharge: HOME | End: 2021-10-06
Payer: MEDICARE

## 2021-10-06 PROCEDURE — 97140 MANUAL THERAPY 1/> REGIONS: CPT | Performed by: OCCUPATIONAL THERAPIST

## 2021-10-06 PROCEDURE — 97110 THERAPEUTIC EXERCISES: CPT | Performed by: OCCUPATIONAL THERAPIST

## 2021-10-06 PROCEDURE — 97112 NEUROMUSCULAR REEDUCATION: CPT | Performed by: OCCUPATIONAL THERAPIST

## 2021-10-06 NOTE — FLOWSHEET NOTE
[facility], [address]    Occupational Therapy Treatment Note/ Progress Report:     Is this a Progress Report:     [x]  Yes  []  No      If Yes:  Date Range for reporting period:  Beginning 21    Ending    Progress report will be due (10 Rx or 30 days whichever is XLYH):21    Recertification will be due (POC Duration  / 90 days whichever is less):21  Patient: Angelita Castillo                                    : 1955                                  MRN: [id number]  Referring Physician:  Salinas Sánchez                                            Evaluation Date: 2021                                           Medical Diagnosis Information:  Diagnosis: R distal radius fx  I49.003M    Treatment Dx: Milyta Schiller wrist painM25. 657                                Date of Injury: 21  Date of Surgery: 21  R wrist ORIF                                Insurance information:   /[facility]     Date of Patient follow up with Physician:4 wksHas the plan of care been signed (Y/N):        []  Yes  [x]  No       Visit # Insurance Allowable Auth Required   15 BMN []  Yes []  No    From 21  to 10/6/2021      Preferred Language for Healthcare:   [x]English       []other:     RESTRICTIONS/PRECAUTIONS: none      Latex Allergy:  []? No      []? Yes                    Pacemaker:  []? No       []? Yes         Functional Scale: 98% (Quick DASH)                                 Date assessed:  2021     SUBJECTIVE: Pain is about the same. Patient reported deficits/history of current problem: Pt tripped over block in sidewalk     Pain Scale see below           []? Constant                [x]? Intermittent              []?other:  Pain Location:  wrist  Easing factors:ice  Provocative factors: AROM      OBJECTIVE:   Date:  Hand Dominance:     []? Right    [x]?  Left 2021 10/6/21    Objective Measures:            PAIN 4/10      R 2/10 with use  L 4/10 with use R: 2-3/10 with use   L: 4/10 with use   Quick DASH 98%     43%     Digits tip to DPFC in cm WFL          Thumb ROM WFL          Thumb opposition  WFL          Thumb Radial/Palmar abd ROM R:  L:          Wrist ROM Ext/Flex R: 62/45  L: 72/65   L: 75/55 R  80/60  L 80/67  75/67  86/67 81/70  86/70    Rad/Uln dev ROM R:  L:          Forearm ROM  Sup/pron R:75/90  L: 85/90   L :90/90        Elbow ROM Ext/flex WFL:          Shoulder Flex  Shoulder Abd  Shoulder IR/ER            Edema in cm circumf. MCPJs R:  L:          Edema in cm circumf. Wrist R:  L:           strength in lbs NT    R - 50  L - 42  R48#  L 49# R: 54.2  L: 53.7    Pinch Strengthin lbs: lat  R:  L:          Pinch Strength in lbs:  3 point R:  L:             MMT:             Observations:  (including splints, bandages, incisions, scars): Steri strips still present R wrist     L wrist still in cast       R wrist MMT 4+/5  L wrist 4/10                MODALITIES: 7/7/21 7/19/21 7/28/21 8/4/21 8/9/21 9/8/21 10/6/21    Fluidotherapy (00536)          Estim (99944/57935)   INF + HP forearm, wrist    INF + HP forearm, wrist    15'     Paraffin (68553)          US (13700)  8'        Iontophoresis (38358)          Hot Pack 10' to precondition tissues  15' at neck 15' at neck 15' at neck HP 10' -   Cold Pack                    INTERVENTIONS:          Pt educ Hammer rotation with 8 ounces. 2 lb. Wrist curls bilaterally wrist flex, ext and RD                   Power web          putty  Yellow  Rolling, shaping; pinching; mallet Yellow  Rolling, shaping; pinching; mallet Yellow  Rolling, shaping; pinching; mallet Yellow  Rolling, shaping; pinching; mallet Yellow  Rolling, shaping; pinching; mallet 5'    Red flexbar individual motions of wrist flex and ext both wrist, combined wrist flex and ext bilaterally and bilateral bending up and down.  R and L  Red flex bar wrist flex/ext  R and L   Red flex bar  Wrist and forearm 2 x 25 R and L   Red flex bar  Wrist and forearm 2 x 25 Power  with sponge  #2  R and L      Wristciser bilateral opposite wrist motions. Pulleys 5' R and L digiflex red x 25  R and L digiflex red x 25 Green digiflex B x 20 each     Adjustable hand gripper [place] second x 40 on both hands. Finisher table to various movement patterns   R and L   Red flex bar  Wrist and forearm 2 x 25 Green x 20 each           S/p 2 wts 15 x 2                                                      Manual Therapy (11589) DTM bilateral forearms with passive wrist stretching in all directions STM,PROM  STM,PROM  STM,PROM  STM,PROM  STM,PROM  STM B wrists and forearm 8'   (IASTM, Dry Needling, manual mobilization)                    Splinting Wearing tubigrip as needed         Lcode:          Orthotic Mgmt, Subsequent Enc (97830)  Fabricated neoprene wrist support to inhibit DRUJ pain     k tape to ulnar sided wrist L   Orthotic Mgmt & Training (93402)                    Other:                      Therapeutic Exercise & NMR:  [] (44488) Provided verbal/tactile cueing for activities related to strengthening, flexibility, endurance, ROM  for improvements in scapular, scapulothoracic and UE control with self care, reaching, carrying, lifting, house/yardwork, driving/computer work.    [] (61977) Provided verbal/tactile cueing for activities related to improving balance, coordination, kinesthetic sense, posture, motor skill, proprioception  to assist with  scapular, scapulothoracic and UE control with self care, reaching, carrying, lifting, house/yardwork, driving/computer work.     Therapeutic Activities & NMR:    [] (99048 or 37672) Provided verbal/tactile cueing for activities related to improving balance, coordination, kinesthetic sense, posture, motor skill, proprioception and motor activation to allow for proper function of scapular, scapulothoracic and UE control with self care, carrying, lifting, driving/computer work    Home Exercise Program:    [] (31500) Reviewed/Progressed HEP activities related to strengthening, flexibility, endurance, ROM of scapular, scapulothoracic and UE control with self care, reaching, carrying, lifting, house/yardwork, driving/computer work  [] (07332) Reviewed/Progressed HEP activities related to improving balance, coordination, kinesthetic sense, posture, motor skill, proprioception of scapular, scapulothoracic and UE control with self care, reaching, carrying, lifting, house/yardwork, driving/computer work      Manual Treatments:  PROM / STM / Oscillations-Mobs:  G-I, II, III, IV (PA's, Inf., Post.)  [] (96773) Provided manual therapy to mobilize soft tissue/joints of cervical/CT, scapular GHJ and UE for the purpose of modulating pain, promoting relaxation,  increasing ROM, reducing/eliminating soft tissue swelling/inflammation/restriction, improving soft tissue extensibility and allowing for proper ROM for normal function with self care, reaching, carrying, lifting, house/yardwork, driving/computer work    ADL Training:  [] (70765) Provided self-care/home management training related to activities of daily living and compensatory training, and/or use of adaptive equipment      Charges:  Timed Code Treatment Minutes: 40'   Total Treatment Minutes: 40   Worker's Comp: Time In/Time Out     [] EVAL (LOW) 80762 (typically 20 minutes face-to-face)    [] EVAL (MOD) 71483 (typically 30 minutes face-to-face)  [] EVAL (HIGH) [phone] (typically 45 minutes face-to-face)  [] OT Re-eval (98339)       [x] Alona ([id number]) x 1     [] NOBZR(54657)  [x] NMR (88764) x  1    [] Estim (attended) (27017)   [x] Manual ([id number]) x   1   [] US (24325)  [] TA (58200) x 1      [] Paraffin (28093)  [] ADL  ([phone]) x     [] Splint/L code:  custom wrist/hand static spint   [] Estim (unattended) 33 93 31)  [] Fluidotherapy (34349)  [] Other: Orthotic Mgmt, Subsequent Enc (95573)    Comorbidities Affecting Functional Performance:     []Anxiety (F41.9)/Depression (F32.9)   []Diabetes Type 1(E10.65) or 2 (E11.65)   []Rheumatoid Arthritis (M05.9)  []Fibromyalgia (M79.7)  []Neuropathy(G60.9)  [x]Osteoarthritis(M19.91)  []None   []Other:    ASSESSMENT:  Good  strength. Focus on wrist strengthening. GOALS:  Patient stated goal: To have full function of both UEs   [x]? Progressing: []? Met: []? Not Met: []? Adjusted     Therapist goals for Patient:   Short Term Goals: To be achieved in: 2 weeks  1. Independent in HEP and progression per patient tolerance, in order to prevent re-injury. []? Progressing: [x]? Met: []? Not Met: []? Adjusted   2. Patient will have a decrease in pain to facilitate improvement in movement, function, and ADLs as indicated by Functional Deficits. []? Progressing: [x]? Met: []? Not Met: []? Adjusted     Long Term Goals to be achieved in 12 weeks (through 8/19/21), including patient directed goals to address patient identified performance deficits:  1) Pt to be independent in graded HEP progression with a good level of effort and compliance. []? Progressing: [x]? Met: []? Not Met: []? Adjusted   2) Pt to report a score of </= 30 % on the Quick DASH disability questionnaire for increased performance with carrying, moving, and handling objects. [x]? Progressing: []? Met: []? Not Met: []? Adjusted   3) Pt will demonstrate increased ROM to [facility] for improved independence with self care, home mgt, leisure activities, driving and sleeping.  []? Progressing: [x]? Met: []? Not Met: []? Adjusted   4) Pt will demonstrate increased strength to at least 25# of  strength in both hands for improved independence with  self care, home mgt, leisure activities, driving and sleeping.  []? Progressing: [x]? Met: []? Not Met: []? Adjusted   5) Pt will have a decrease in pain to 2/10 to facilitate  self care, home mgt, leisure activities, driving and sleeping. [x]? Progressing: []? Met: []? Not Met: []? Adjusted    Overall Progression Towards Functional Goals/Treatment Progress Update:  [x] Patient is progressing as expected towards functional goals listed. [] Progression is slowed due to complexities/impairments listed. [] Progression has been slowed due to co-morbidities. [] Plan just implemented, too soon to assess goals progression <30 days  [] Goals require adjustment due to lack of progress  [] Patient is not progressing as expected and requires additional follow up with physician  [] All goals are met  [] Other:     Prognosis for POC: [x] Good [] Fair  [] Poor    Patient requires continued skilled intervention: [x] Yes  [] No    Treatment/Activity Tolerance:  [x] Patient able to complete treatment  [] Patient limited by fatigue  [] Patient limited by pain    [] Patient limited by other medical complications  [] Other:                  PLAN: See eval  [x] Continue per plan of care [] Alter current plan (see comments above)  [] Plan of care initiated [] Hold pending MD visit [] Discharge      Electronically signed by:  Mendel Rote, OTR/L [id number]    Note: If patient does not return for scheduled/ recommended follow up visits, this note will serve as a discharge from care along with most recent update on progress.   Phone: [phone]  Fax [phone]

## 2021-10-12 ENCOUNTER — APPOINTMENT (OUTPATIENT)
Dept: OCCUPATIONAL THERAPY | Age: 66
End: 2021-10-12
Payer: MEDICARE

## 2021-10-14 ENCOUNTER — APPOINTMENT (OUTPATIENT)
Dept: OCCUPATIONAL THERAPY | Age: 66
End: 2021-10-14
Payer: MEDICARE

## 2021-10-19 ENCOUNTER — HOSPITAL ENCOUNTER (OUTPATIENT)
Dept: OCCUPATIONAL THERAPY | Age: 66
Setting detail: THERAPIES SERIES
Discharge: HOME | End: 2021-10-19
Payer: MEDICARE

## 2021-10-19 PROCEDURE — 97110 THERAPEUTIC EXERCISES: CPT | Performed by: OCCUPATIONAL THERAPIST

## 2021-10-19 PROCEDURE — 97112 NEUROMUSCULAR REEDUCATION: CPT | Performed by: OCCUPATIONAL THERAPIST

## 2021-10-19 PROCEDURE — 97140 MANUAL THERAPY 1/> REGIONS: CPT | Performed by: OCCUPATIONAL THERAPIST

## 2021-10-19 NOTE — FLOWSHEET NOTE
6/9/21 6/16/21 7/7/21 7/19/21 9/8/21 10/6/21  10/19/21   Objective Measures:             PAIN 4/10      R 2/10 with use  L 4/10 with use R: 2-3/10 with use   L: 4/10 with use R and L 2-3/10   Quick DASH 98%     43%   34%   Digits tip to DPFC in cm WFL           Thumb ROM WFL           Thumb opposition  WFL           Thumb Radial/Palmar abd ROM R:  L:           Wrist ROM Ext/Flex R: 62/45  L: 72/65   L: 75/55 R  80/60  L 80/67  75/67  86/67 81/70  86/70  85/75  90/70   Rad/Uln dev ROM R:  L:           Forearm ROM  Sup/pron R:75/90  L: 85/90   L :90/90         Elbow ROM Ext/flex WFL:           Shoulder Flex  Shoulder Abd  Shoulder IR/ER             Edema in cm circumf. MCPJs R:  L:           Edema in cm circumf. Wrist R:  L:            strength in lbs NT    R - 50  L - 42  R48#  L 49# R: 54.2  L: 53.7  R 53  L 55   Pinch Strengthin lbs: lat  R:  L:           Pinch Strength in lbs:  3 point R:  L:              MMT:              Observations:  (including splints, bandages, incisions, scars): Steri strips still present R wrist     L wrist still in cast       R wrist MMT 4+/5  L wrist 4/10                 MODALITIES: 7/7/21 7/19/21 7/28/21 8/4/21 8/9/21 9/8/21 10/6/21  10/19/21   Fluidotherapy (09181)           Estim (66352/72917)   INF + HP forearm, wrist    INF + HP forearm, wrist    15'      Paraffin (72990)           US (67605)  8'         Iontophoresis (69467)           Hot Pack 10' to precondition tissues  15' at neck 15' at neck 15' at neck HP 10' -    Cold Pack                      INTERVENTIONS:           Pt educ Hammer rotation with 8 ounces. 2 lb.  Wrist curls bilaterally wrist flex, ext and RD       Review and revise HEP: including passive forearm/wrist stretch and CMC jt protection strategies/ pain mgt              Power web           putty  Yellow  Rolling, shaping; pinching; mallet Yellow  Rolling, shaping; pinching; mallet Yellow  Rolling, shaping; pinching; mallet Yellow  Rolling, shaping; lifting, house/yardwork, driving/computer work.     Therapeutic Activities & NMR:    [] (35606 or 26575) Provided verbal/tactile cueing for activities related to improving balance, coordination, kinesthetic sense, posture, motor skill, proprioception and motor activation to allow for proper function of scapular, scapulothoracic and UE control with self care, carrying, lifting, driving/computer work    Home Exercise Program:    [] (67324) Reviewed/Progressed HEP activities related to strengthening, flexibility, endurance, ROM of scapular, scapulothoracic and UE control with self care, reaching, carrying, lifting, house/yardwork, driving/computer work  [] (17473) Reviewed/Progressed HEP activities related to improving balance, coordination, kinesthetic sense, posture, motor skill, proprioception of scapular, scapulothoracic and UE control with self care, reaching, carrying, lifting, house/yardwork, driving/computer work      Manual Treatments:  PROM / STM / Oscillations-Mobs:  G-I, II, III, IV (PA's, Inf., Post.)  [] (18390) Provided manual therapy to mobilize soft tissue/joints of cervical/CT, scapular GHJ and UE for the purpose of modulating pain, promoting relaxation,  increasing ROM, reducing/eliminating soft tissue swelling/inflammation/restriction, improving soft tissue extensibility and allowing for proper ROM for normal function with self care, reaching, carrying, lifting, house/yardwork, driving/computer work    ADL Training:  [] (75520) Provided self-care/home management training related to activities of daily living and compensatory training, and/or use of adaptive equipment      Charges:  Timed Code Treatment Minutes: 39'   Total Treatment Minutes: 39'   Worker's Comp: Time In/Time Out     [] EVAL (LOW) 58618 (typically 20 minutes face-to-face)    [] EVAL (MOD) 02491 (typically 30 minutes face-to-face)  [] EVAL (HIGH) 31627 (typically 45 minutes face-to-face)  [] OT Re-eval (08739)       [x] Alona ([id number]) x 1 [] QCRRQ(73693)  [x] NMR (38391) x  1    [] Estim (attended) (92363)   [x] Manual ([id number]) x   1   [] US (12901)  [] TA (78244) x 1      [] Paraffin (52096)  [] ADL  (59039) x     [] Splint/L code:  custom wrist/hand static spint   [] Estim (unattended) (82224)  [] Fluidotherapy (32095)  [] Other: Orthotic Mgmt, Subsequent Enc (23008)    Comorbidities Affecting Functional Performance:     []Anxiety (F41.9)/Depression (F32.9)   []Diabetes Type 1(E10.65) or 2 (E11.65)   []Rheumatoid Arthritis (M05.9)  []Fibromyalgia (M79.7)  []Neuropathy(G60.9)  [x]Osteoarthritis(M19.91)  []None   []Other:    ASSESSMENT:  Good  strength. Focus on wrist strengthening. GOALS:  Patient stated goal: To have full function of both UEs   [x]? Progressing: []? Met: []? Not Met: []? Adjusted     Therapist goals for Patient:   Short Term Goals: To be achieved in: 2 weeks  1. Independent in HEP and progression per patient tolerance, in order to prevent re-injury. []? Progressing: [x]? Met: []? Not Met: []? Adjusted   2. Patient will have a decrease in pain to facilitate improvement in movement, function, and ADLs as indicated by Functional Deficits. []? Progressing: [x]? Met: []? Not Met: []? Adjusted     Long Term Goals to be achieved in 12 weeks (through 8/19/21), including patient directed goals to address patient identified performance deficits:  1) Pt to be independent in graded HEP progression with a good level of effort and compliance. []? Progressing: [x]? Met: []? Not Met: []? Adjusted   2) Pt to report a score of </= 30 % on the Quick DASH disability questionnaire for increased performance with carrying, moving, and handling objects. [x]? Progressing: []? Met: []? Not Met: []? Adjusted   3) Pt will demonstrate increased ROM to [facility] for improved independence with self care, home mgt, leisure activities, driving and sleeping.  []? Progressing: [x]? Met: []? Not Met: []?  Adjusted   4) Pt will demonstrate increased strength to at least 25# of  strength in both hands for improved independence with  self care, home mgt, leisure activities, driving and sleeping.  []? Progressing: [x]? Met: []? Not Met: []? Adjusted   5) Pt will have a decrease in pain to 2/10 to facilitate  self care, home mgt, leisure activities, driving and sleeping. [x]? Progressing: []? Met: []? Not Met: []? Adjusted    Overall Progression Towards Functional Goals/Treatment Progress Update:  [x] Patient is progressing as expected towards functional goals listed. [] Progression is slowed due to complexities/impairments listed. [] Progression has been slowed due to co-morbidities. [] Plan just implemented, too soon to assess goals progression <30 days  [] Goals require adjustment due to lack of progress  [] Patient is not progressing as expected and requires additional follow up with physician  [] All goals are met  [] Other:     Prognosis for POC: [x] Good [] Fair  [] Poor    Patient requires continued skilled intervention: [x] Yes  [] No    Treatment/Activity Tolerance:  [x] Patient able to complete treatment  [] Patient limited by fatigue  [] Patient limited by pain    [] Patient limited by other medical complications  [] Other:                  PLAN: See eval  [x] Continue per plan of care [] Alter current plan (see comments above)  [] Plan of care initiated [] Hold pending MD visit [] Discharge      Electronically signed by:  TEE Blue/L [id number]    Note: If patient does not return for scheduled/ recommended follow up visits, this note will serve as a discharge from care along with most recent update on progress.   Phone: [phone]  Fax [phone]

## 2021-10-21 NOTE — DISCHARGE SUMMARY
[facility], [address]    Occupational Therapy Discharge Report:     Is this a Progress Report:     [x]  Yes  []  No      If Yes:  Date Range for reporting period:  Beginning 21    Ending    Progress report will be due (10 Rx or 30 days whichever is UJVT):97    Recertification will be due (POC Duration  / 90 days whichever is less):21  Patient: Elsa Adams                                    : 1955                                  MRN: [id number]  Referring Physician:  Jalen Bradley                                            Evaluation Date: 2021                                           Medical Diagnosis Information:  Diagnosis: R distal radius fx  J84.115N    Treatment Dx: Starleen Fruit wrist painM25. 786                                Date of Injury: 21  Date of Surgery: 21  R wrist ORIF                                Insurance information:   /[facility]     Date of Patient follow up with Physician:4 wksHas the plan of care been signed (Y/N):        []  Yes  [x]  No       Visit # Insurance Allowable Auth Required   16 BMN []  Yes []  No    From 21  to 10/21/2021      Preferred Language for Healthcare:   [x]English       []other:     RESTRICTIONS/PRECAUTIONS: none      Latex Allergy:  []? No      []? Yes                    Pacemaker:  []? No       []? Yes         Functional Scale: 98% (Quick DASH)                                 Date assessed:  2021     SUBJECTIVE: \"I was able to show my dog at one of the shows last week. \"           OBJECTIVE:   Date:  Hand Dominance:     []? Right    [x]?  Left 2021      6/2/21 6/9/21 6/16/21 7/7/21 7/19/21 9/8/21 10/6/21  10/19/21   Objective Measures:             PAIN 4/10      R 2/10 with use  L 4/10 with use R: 2-3/10 with use   L: 4/10 with use R and L 2-3/10   Quick DASH 98%     43%   34%   Digits tip to DPFC in cm WFL           Thumb ROM [facility] Thumb opposition  [facility]           Thumb Radial/Palmar abd ROM R:  L:           Wrist ROM Ext/Flex R: 62/45  L: 72/65   L: 75/55 R  80/60  L 80/67  75/67  86/67 81/70  86/70  85/75  90/70   Rad/Uln dev ROM R:  L:           Forearm ROM  Sup/pron R:75/90  L: 85/90   L :90/90         Elbow ROM Ext/flex WFL:           Shoulder Flex  Shoulder Abd  Shoulder IR/ER             Edema in cm circumf. MCPJs R:  L:           Edema in cm circumf. Wrist R:  L:            strength in lbs NT    R - 50  L - 42  R48#  L 49# R: 54.2  L: 53.7  R 53  L 55   Pinch Strengthin lbs: lat  R:  L:           Pinch Strength in lbs:  3 point R:  L:              MMT:              Observations:  (including splints, bandages, incisions, scars): Steri strips still present R wrist     L wrist still in cast       R wrist MMT 4+/5  L wrist 4/10          ASSESSMENT:  Good  strength. Focus on wrist strengthening. GOALS:  Patient stated goal: To have full function of both UEs   [x]? Progressing: []? Met: []? Not Met: []? Adjusted     Therapist goals for Patient:   Short Term Goals: To be achieved in: 2 weeks  1. Independent in HEP and progression per patient tolerance, in order to prevent re-injury. []? Progressing: [x]? Met: []? Not Met: []? Adjusted   2. Patient will have a decrease in pain to facilitate improvement in movement, function, and ADLs as indicated by Functional Deficits. []? Progressing: [x]? Met: []? Not Met: []? Adjusted     Long Term Goals to be achieved in 12 weeks (through 8/19/21), including patient directed goals to address patient identified performance deficits:  1) Pt to be independent in graded HEP progression with a good level of effort and compliance. []? Progressing: [x]? Met: []? Not Met: []? Adjusted   2) Pt to report a score of </= 30 % on the Quick DASH disability questionnaire for increased performance with carrying, moving, and handling objects. [x]? Progressing: []? Met: []? Not Met: []?  Adjusted 3) Pt will demonstrate increased ROM to [facility] for improved independence with self care, home mgt, leisure activities, driving and sleeping.  []? Progressing: [x]? Met: []? Not Met: []? Adjusted   4) Pt will demonstrate increased strength to at least 25# of  strength in both hands for improved independence with  self care, home mgt, leisure activities, driving and sleeping.  []? Progressing: [x]? Met: []? Not Met: []? Adjusted   5) Pt will have a decrease in pain to 2/10 to facilitate  self care, home mgt, leisure activities, driving and sleeping.  []? Progressing: [x]? Met: []? Not Met: []? Adjusted    Overall Progression Towards Functional Goals/Treatment Progress Update:  [] Patient is progressing as expected towards functional goals listed. [] Progression is slowed due to complexities/impairments listed. [] Progression has been slowed due to co-morbidities. [] Plan just implemented, too soon to assess goals progression <30 days  [] Goals require adjustment due to lack of progress  [] Patient is not progressing as expected and requires additional follow up with physician  [x] Most All goals are met - OT anticipates that Pt will meet goal #2 related to functional use with time and further healing. [] Other:     Prognosis for POC: [x] Good [] Fair  [] Poor    Patient requires continued skilled intervention: [x] Yes  [] No                   PLAN: See eval  [x] Continue per plan of care [] Alter current plan (see comments above)  [] Plan of care initiated [] Hold pending MD visit [x] Discharge      Electronically signed by:  TEE Correa/L [id number]    Note: If patient does not return for scheduled/ recommended follow up visits, this note will serve as a discharge from care along with most recent update on progress.   Phone: [phone]  Fax [phone]

## 2021-10-26 ENCOUNTER — APPOINTMENT (OUTPATIENT)
Dept: OCCUPATIONAL THERAPY | Age: 66
End: 2021-10-26
Payer: MEDICARE

## 2021-10-27 ENCOUNTER — APPOINTMENT (OUTPATIENT)
Dept: PHYSICAL THERAPY | Age: 66
End: 2021-10-27
Payer: MEDICARE

## 2021-11-02 ENCOUNTER — APPOINTMENT (OUTPATIENT)
Dept: PHYSICAL THERAPY | Age: 66
End: 2021-11-02
Payer: MEDICARE

## 2021-11-09 ENCOUNTER — HOSPITAL ENCOUNTER (OUTPATIENT)
Dept: PHYSICAL THERAPY | Age: 66
Setting detail: THERAPIES SERIES
Discharge: HOME | End: 2021-11-09
Payer: MEDICARE

## 2021-11-09 PROCEDURE — 97162 PT EVAL MOD COMPLEX 30 MIN: CPT

## 2021-11-09 ASSESSMENT — PAIN SCALES - GENERAL: PAINLEVEL_OUTOF10: 3

## 2021-11-09 NOTE — PROGRESS NOTES
Physical Therapy  Initial Assessment  Date: 2021  Patient Name: Soahm Cortez  MRN: [id number]  : 1955    Subjective   General  Chart Reviewed: Yes  Patient assessed for rehabilitation services?: Yes  Family / Caregiver Present: No  Referring Practitioner: Radha Ocampo  Referral Date : 10/18/21  Diagnosis: Hip Pain, Chronic LBP  Follows Commands: Within Functional Limits  General Comment  Comments: pLOF: full functional activity without limits to pain. Currently, 60% PLOF. PT Visit Information  PT Insurance Information: Medicare  Subjective  Subjective: Pt c/o 'sciatic' pain in L gluteals that wouldn't go away over the past few months. Pt broke both wrists. Pt had injection 3 weeks ago and notes good improvement with regard to pain. Pt no longer limping. LBP persists. L4-L5 fusion a few years ago wtih Dr. Stephen Ochoa. Decreased strength training during the pandemic the past 2 years. Osteopenia. Sitting for 1 hour will aggrave the LB. Driving that long will also aggravate the LB especially with sit/stand transfers. Standing ok but limited to about 1 hour due to LBP. Walking aggravates LBP within about 30 mins. Always has a pinching feel in the SI region and L gluteal.  No numnbess or tingling in the LEs. Pt shows dogs for a hobby. Knees are 'good. ACL repair 2x on L. Bunions on B feet. Surgery 20 years ago on L foot to remove bunion. R foot pain worse than L.  X-rays (+) for ddd lumbar spine. Possible retrolysthesis L5-S1. Weakness noted in the back and B LEs. Pain Screening  Patient Currently in Pain: Yes  Pain Assessment  Pain Assessment: 0-10  Pain Level: 3 (at worst 5/10)  Vital Signs  Patient Currently in Pain: Yes    Objective     Observation/Palpation  Posture: Good  Observation: Standing:  R pelvic rotation. L pes planus. R bunion. L hand dominant but R shoulder depressed compared to L. Mild sway but decreased gluteal and corre activation with standing posture.   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        Therapy Time   Individual Concurrent Group Co-treatment   Time In  10:00         Time Out  10:30         Minutes  0                 Mi Laguna PT

## 2021-11-09 NOTE — PROGRESS NOTES
Outpatient Physical Therapy  Phone: [phone] Fax: [phone]     To: Alejandro Rios     From: Lisy Rios PT     Patient: Kinsey Grossman      : 1955  Diagnosis: Hip Pain, Chronic LBP   Date: 2021  Treatment Diagnosis:      Physical Therapy Certification/Re-Certification Form  Dear Dr. Larisa Kovacs,   The following patient has been evaluated for physical therapy services and for therapy to continue, Medicare requires monthly physician review of the treatment plan. Please review the attached evaluation and/or summary of the patient's plan of care, and verify that you agree therapy should continue by signing the attached document and sending it back to our office. Plan of Care/Treatment to date:  [x] Therapeutic Exercise   [] Modalities:  [x] Therapeutic Activity     [] Ultrasound  [] Electrical Stimulation   [x] Gait Training      [] Cervical Traction [] Lumbar Traction  [x] Neuromuscular Re-education  [] Hot/Coldpack [] Iontophoresis    [x] Instruction in HEP      Other:  [x] Manual Therapy       []                        [] Aquatic Therapy       []                      ? Frequency/Duration:  # Days per week: [] 1 day # Weeks: [] 1 week [] 5 weeks      [x] 2 days? [] 2 weeks [x] 6 weeks     [] 3 days   [] 3 weeks [] 7 weeks     [] 4 days   [] 4 weeks [] 8 weeks    Rehab Potential: [] Excellent [x] Good [] Fair  [] Poor       Electronically signed by:  Lisy Rios PT      If you have any questions or concerns, please don't hesitate to call.   Thank you for your referral.    Physician Signature:________________________________Date:__________________  By signing above, therapists plan is approved by physician

## 2021-11-09 NOTE — FLOWSHEET NOTE
[facility], [facility], [address] [place] [address] Dr  Phone: [phone]  Fax [phone]    Physical Therapy Daily Treatment Note    Date:  2021    Patient Name:  Diann Hoover    :  1955  MRN: [id number]  Restrictions/Precautions:  L4-5 fusion, retrolisthesis  Medical/Treatment Diagnosis Information:  · Diagnosis: Hip Pain, Chronic LBP  Insurance/Certification information:  PT Insurance Information: Medicare  Physician Information:  Referring Practitioner: Jus Link  Plan of care signed (Y/N):  N  Visit# / total visits:      G-Code (if applicable): Modified Oswestry  28% disability  LEFS    35% disability     Medicare Cap (if applicable):  70 = total amount used, updated 2021    Time in:  10:00     Timed Treatment: 0    Total Treatment Time:  30  Time out: 10:30  ________________________________________________________________________________________    Pain Level:    3/10  SUBJECTIVE:  Pt c/o 'sciatic' pain in L gluteals that wouldn't go away over the past few months. Pt broke both wrists. Pt had injection 3 weeks ago and notes good improvement with regard to pain. Pt no longer limping. LBP persists. L4-L5 fusion a few years ago [facility] Dr. Misael Downs. Decreased strength training during the pandemic the past 2 years. Osteopenia. Sitting for 1 hour will aggrave the LB. Driving that long will also aggravate the LB especially with sit/stand transfers. Standing ok but limited to about 1 hour due to LBP. Walking aggravates LBP within about 30 mins. Always has a pinching feel in the SI region and L gluteal.  No numnbess or tingling in the LEs. Pt shows dogs for a hobby. Knees are 'good. ACL repair 2x on L. Bunions on B feet. Surgery 20 years ago on L foot to remove bunion. R foot pain worse than L.  X-rays (+) for ddd lumbar spine. Possible retrolysthesis L5-S1.   Weakness noted in the back and B LEs.    OBJECTIVE:     Exercise/Equipment Resistance/Repetitions Other comments          TA sets   NV     Bridging  NV     Clamshells NV     Prone hip extension NV                                                         Hip IR/ER neuro re-ed training   NV                                                 Other Therapeutic Activities:      Manual Treatments: NV          Modalities:      Test/Measurements:    Observation: Standing:  R pelvic rotation. L pes planus. R bunion. L hand dominant but R shoulder depressed compared to L. Mild sway but decreased gluteal and corre activation with standing posture. Body Mechanics: SLS L with increased pelvic rotation R, hip IR, trendelenberg and LOB. R with mild LOB and hip IR. SLS squat L with increased hip IR, knee valgus, trendelenberg, LOB. R with LoB and knee valgus. Squat test: quad dominance, hip IR, K' valgus. AMB: increased L toeing out, decreased B hip extension, ERS lumbar spine. Hip IR B, trendelenberg B.     Spine ROM  Lumbar: Full flexion without pain. B SB decreased 50% with pain on R with R SB. Extension WNL with pain in the R. Rotation limited 50% with pain on R with R rotation. Joint Mobility  ROM RLE: AGMR R hip  ROM LLE: Hypomobility L hip joint, AGMR L hip     Strength RLE  Strength RLE: WNL  Comment: Hip ER 4-/5; Hip IR 4-/5  Strength LLE  Strength LLE: WNL  Comment: Hip ER 4-/5; Hip IR 4-/5  Additional Measures  Flexibility: R hip ER 20 degrees, L hip IR 20 degrees. Special Tests: Prone hip extension test hamstring dominant. (-) clonus, (-) babinski. normal DTR B LEs, normal sensation B LEs. Denies B/B  Other: Hypomobility L Si joint       ASSESSMENT:    Pt presents with movement impairments AGMR B hips and ERS lumbar spine. Quad dominance with squat test and hamstring dominance with hip extension testing. Weakness B gluteals.      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      Plan: [x] Continue per plan of care [] Alter current plan (see comments)   [x] Plan of care initiated [] Hold pending MD visit [] Discharge      Plan for Next Session:      Re-Certification Due Date:         Signature:  Tony Mead PT

## 2021-11-16 ENCOUNTER — HOSPITAL ENCOUNTER (OUTPATIENT)
Dept: PHYSICAL THERAPY | Age: 66
Setting detail: THERAPIES SERIES
Discharge: HOME | End: 2021-11-16
Payer: MEDICARE

## 2021-11-16 NOTE — FLOWSHEET NOTE
Physical Therapy  Cancellation/No-show Note  Patient Name:  Neel Pierce  :  1955   Date:  2021  Cancels to date: 1  No-shows to date: 0    For today's appointment patient:  [x] Cancelled  [] Rescheduled appointment  [] No-show     Reason given by patient:  [] Patient ill  [] Conflicting appointment  [] No transportation    [] Conflict with work  [] No reason given  [] Other:     Comments:      Electronically signed by:  Valdo Jimenez PT

## 2021-11-18 ENCOUNTER — HOSPITAL ENCOUNTER (OUTPATIENT)
Dept: PHYSICAL THERAPY | Age: 66
Setting detail: THERAPIES SERIES
Discharge: HOME | End: 2021-11-18
Payer: MEDICARE

## 2021-11-18 PROCEDURE — 97110 THERAPEUTIC EXERCISES: CPT

## 2021-11-18 PROCEDURE — 97112 NEUROMUSCULAR REEDUCATION: CPT

## 2021-11-18 PROCEDURE — 97140 MANUAL THERAPY 1/> REGIONS: CPT

## 2021-11-18 NOTE — FLOWSHEET NOTE
[facility], [facility], [address], [address]  Phone: [phone]  Fax [phone]    Physical Therapy Daily Treatment Note    Date:  2021    Patient Name:  Silvana Torres    :  1955  MRN: [id number]  Restrictions/Precautions:  L4-5 fusion, retrolisthesis. Osteopeania  Medical/Treatment Diagnosis Information:  · Diagnosis: Hip Pain, Chronic LBP  Insurance/Certification information:  PT Insurance Information: Medicare  Physician Information:  Referring Practitioner: Shiloh Chris  Plan of care signed (Y/N):  N  Visit# / total visits:      G-Code (if applicable): Modified Oswestry  28% disability  LEFS    35% disability     Medicare Cap (if applicable):  894 = total amount used, updated 2021    Time in:  12:30     Timed Treatment: 45   Total Treatment Time:  45  Time out: 1:15  ________________________________________________________________________________________    Pain Level:    4-5/10  SUBJECTIVE:  Pt notes increased pain at the injection area on the L gluteal and in the R low back. Pt notes she is 'limping' today. At initial evaluation:  Pt c/o 'sciatic' pain in L gluteals that wouldn't go away over the past few months. Pt broke both wrists. Pt had injection 3 weeks ago and notes good improvement with regard to pain. Pt no longer limping. LBP persists. L4-L5 fusion a few years ago wtih Dr. Alexi Dye. Decreased strength training during the pandemic the past 2 years. Osteopenia. Sitting for 1 hour will aggrave the LB. Driving that long will also aggravate the LB especially with sit/stand transfers. Standing ok but limited to about 1 hour due to LBP. Walking aggravates LBP within about 30 mins. Always has a pinching feel in the SI region and L gluteal.  No numnbess or tingling in the LEs. Pt shows dogs for a hobby. Knees are 'good. ACL repair 2x on L. Bunions on B feet.   Surgery 20 years ago on L foot to remove bunion. R foot pain worse than L.  X-rays (+) for ddd lumbar spine. Possible retrolysthesis L5-S1. Weakness noted in the back and B LEs. OBJECTIVE:     Exercise/Equipment Resistance/Repetitions Other comments          TA sets with BP cuff        With march   Until achieved  x10 B  HEP video    Bridging  10x5 secs   HEP video    Clamshells NV     Prone hip extension NV            3 way ball compression   2x30 secs forward  10x5 secs diagonally                                          Hip IR/ER neuro re-ed training   x6 mins B                                                  Other Therapeutic Activities:      Manual Treatments: Gr 4 LAD B hips followed by MWM B hip flexion. Hip IR/ER neuro re-ed training B. Modalities:      Test/Measurements:    Observation: Standing:  R pelvic rotation. L pes planus. R bunion. L hand dominant but R shoulder depressed compared to L. Mild sway but decreased gluteal and corre activation with standing posture. Body Mechanics: SLS L with increased pelvic rotation R, hip IR, trendelenberg and LOB. R with mild LOB and hip IR. SLS squat L with increased hip IR, knee valgus, trendelenberg, LOB. R with LoB and knee valgus. Squat test: quad dominance, hip IR, K' valgus. AMB: increased L toeing out, decreased B hip extension, ERS lumbar spine. Hip IR B, trendelenberg B.     Spine ROM  Lumbar: Full flexion without pain. B SB decreased 50% with pain on R with R SB. Extension WNL with pain in the R. Rotation limited 50% with pain on R with R rotation. Joint Mobility  ROM RLE: AGMR R hip  ROM LLE: Hypomobility L hip joint, AGMR L hip     Strength RLE  Strength RLE: WNL  Comment: Hip ER 4-/5; Hip IR 4-/5  Strength LLE  Strength LLE: WNL  Comment: Hip ER 4-/5; Hip IR 4-/5  Additional Measures  Flexibility: R hip ER 20 degrees, L hip IR 20 degrees. Special Tests: Prone hip extension test hamstring dominant. (-) clonus, (-) babinski.   normal DTR B LEs, normal sensation B LEs. Denies B/B  Other: Hypomobility L Si joint       ASSESSMENT:    Pt presents with movement impairments AGMR B hips and ERS lumbar spine. Quad dominance with squat test and hamstring dominance with hip extension testing. Weakness B gluteals. 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      Plan: [x] Continue per plan of care [] Alter current plan (see comments)   [x] Plan of care initiated [] Hold pending MD visit [] Discharge      Plan for Next Session:      Re-Certification Due Date:         Signature:  Matt Muñiz PT

## 2021-11-22 ENCOUNTER — HOSPITAL ENCOUNTER (OUTPATIENT)
Dept: PHYSICAL THERAPY | Age: 66
Setting detail: THERAPIES SERIES
Discharge: HOME | End: 2021-11-22
Payer: MEDICARE

## 2021-11-22 PROCEDURE — 97140 MANUAL THERAPY 1/> REGIONS: CPT

## 2021-11-22 PROCEDURE — 97110 THERAPEUTIC EXERCISES: CPT

## 2021-11-22 PROCEDURE — 97112 NEUROMUSCULAR REEDUCATION: CPT

## 2021-11-22 NOTE — FLOWSHEET NOTE
[facility], [facility], [address], [address]  Phone: [phone]  Fax [phone]    Physical Therapy Daily Treatment Note    Date:  2021    Patient Name:  Jessika Johnson    :  1955  MRN: [id number]  Restrictions/Precautions:  L4-5 fusion, retrolisthesis. Osteopeania  Medical/Treatment Diagnosis Information:  · Diagnosis: Hip Pain, Chronic LBP  Insurance/Certification information:  PT Insurance Information: Medicare  Physician Information:  Referring Practitioner: Hattie Chan  Plan of care signed (Y/N):  N  Visit# / total visits:  3/12    G-Code (if applicable): Modified Oswestry  28% disability  LEFS    35% disability     Medicare Cap (if applicable):  668 = total amount used, updated 2021    Time in:  10:00     Timed Treatment: 45   Total Treatment Time:  45  Time out: 10:45  ________________________________________________________________________________________    Pain Level:    4-5/10  SUBJECTIVE:  Pt notes 'I am still limping'. Sharp pain in the lower R back. At initial evaluation:  Pt c/o 'sciatic' pain in L gluteals that wouldn't go away over the past few months. Pt broke both wrists. Pt had injection 3 weeks ago and notes good improvement with regard to pain. Pt no longer limping. LBP persists. L4-L5 fusion a few years ago wt Dr. Mayda Beasley. Decreased strength training during the pandemic the past 2 years. Osteopenia. Sitting for 1 hour will aggrave the LB. Driving that long will also aggravate the LB especially with sit/stand transfers. Standing ok but limited to about 1 hour due to LBP. Walking aggravates LBP within about 30 mins. Always has a pinching feel in the SI region and L gluteal.  No numnbess or tingling in the LEs. Pt shows dogs for a hobby. Knees are 'good. ACL repair 2x on L. Bunions on B feet. Surgery 20 years ago on L foot to remove bunion.   R foot pain worse than L.  X-rays (+) for ddd lumbar spine. Possible retrolysthesis L5-S1. Weakness noted in the back and B LEs. OBJECTIVE:     Exercise/Equipment Resistance/Repetitions Other comments          TA sets with BP cuff        With march        With KFO   Until achieved  x10 B   x10 B  HEP video    Bridging  10x5 secs   HEP video    Clamshells NV     Prone hip extension NV            3 way ball compression   2x30 secs forward  10x5 secs diagonally                  DKTC      x30                    Hip IR/ER neuro re-ed training   x6 mins B                                                  Other Therapeutic Activities:      Manual Treatments: Gr 4 LAD B hips followed by MWM B hip flexion. Hip IR/ER neuro re-ed training B.  B hip flexion MWM. Gr 3 lumbar mobs L1-3. Modalities:      Test/Measurements:    Observation: Standing:  R pelvic rotation. L pes planus. R bunion. L hand dominant but R shoulder depressed compared to L. Mild sway but decreased gluteal and corre activation with standing posture. Body Mechanics: SLS L with increased pelvic rotation R, hip IR, trendelenberg and LOB. R with mild LOB and hip IR. SLS squat L with increased hip IR, knee valgus, trendelenberg, LOB. R with LoB and knee valgus. Squat test: quad dominance, hip IR, K' valgus. AMB: increased L toeing out, decreased B hip extension, ERS lumbar spine. Hip IR B, trendelenberg B.     Spine ROM  Lumbar: Full flexion without pain. B SB decreased 50% with pain on R with R SB. Extension WNL with pain in the R. Rotation limited 50% with pain on R with R rotation. Joint Mobility  ROM RLE: AGMR R hip  ROM LLE: Hypomobility L hip joint, AGMR L hip     Strength RLE  Strength RLE: WNL  Comment: Hip ER 4-/5; Hip IR 4-/5  Strength LLE  Strength LLE: WNL  Comment: Hip ER 4-/5; Hip IR 4-/5  Additional Measures  Flexibility: R hip ER 20 degrees, L hip IR 20 degrees. Special Tests: Prone hip extension test hamstring dominant.   (-)

## 2021-11-24 ENCOUNTER — HOSPITAL ENCOUNTER (OUTPATIENT)
Dept: PHYSICAL THERAPY | Age: 66
Setting detail: THERAPIES SERIES
Discharge: HOME | End: 2021-11-24
Payer: MEDICARE

## 2021-11-24 PROCEDURE — 97110 THERAPEUTIC EXERCISES: CPT

## 2021-11-24 PROCEDURE — 97112 NEUROMUSCULAR REEDUCATION: CPT

## 2021-11-24 PROCEDURE — 97140 MANUAL THERAPY 1/> REGIONS: CPT

## 2021-11-24 NOTE — FLOWSHEET NOTE
[facility], [facility], [address], [address]  Phone: [phone]  Fax [phone]    Physical Therapy Daily Treatment Note    Date:  2021    Patient Name:  Serjio Mills    :  1955  MRN: [id number]  Restrictions/Precautions:  L4-5 fusion, retrolisthesis. Osteopeania  Medical/Treatment Diagnosis Information:  · Diagnosis: Hip Pain, Chronic LBP  Insurance/Certification information:  PT Insurance Information: Medicare  Physician Information:  Referring Practitioner: Tatiana Basurto  Plan of care signed (Y/N):  N  Visit# / total visits:      G-Code (if applicable): Modified Oswestry  28% disability  LEFS    35% disability     Medicare Cap (if applicable):  670 = total amount used, updated 2021    Time in:  10:00     Timed Treatment: 45   Total Treatment Time:  45  Time out: 10:45  ________________________________________________________________________________________    Pain Level:    -5/10  SUBJECTIVE:  Pt c/o sharp pain in the lower R back. Pt has an hour car ride to get to therapy. Pain has been bothering her since her last visit. At initial evaluation:  Pt c/o 'sciatic' pain in L gluteals that wouldn't go away over the past few months. Pt broke both wrists. Pt had injection 3 weeks ago and notes good improvement with regard to pain. Pt no longer limping. LBP persists. L4-L5 fusion a few years ago wt Dr. Agatha Burrell. Decreased strength training during the pandemic the past 2 years. Osteopenia. Sitting for 1 hour will aggrave the LB. Driving that long will also aggravate the LB especially with sit/stand transfers. Standing ok but limited to about 1 hour due to LBP. Walking aggravates LBP within about 30 mins. Always has a pinching feel in the SI region and L gluteal.  No numnbess or tingling in the LEs. Pt shows dogs for a hobby. Knees are 'good. ACL repair 2x on L.   Bunions on B feet.  Surgery 20 years ago on L foot to remove bunion. R foot pain worse than L.  X-rays (+) for ddd lumbar spine. Possible retrolysthesis L5-S1. Weakness noted in the back and B LEs. OBJECTIVE:     Exercise/Equipment Resistance/Repetitions Other comments          TA sets with BP cuff        With march        With KFO   Until achieved  x10 B   x10 B  HEP video    Bridging  10x5 secs   HEP video    Clamshells 8x5 secs B   HEP video    Prone hip extension NV     Lumbo pelvic stabilization   x2 mins  Multifidus    3 way ball compression   2x30 secs forward  10x5 secs diagonally                  DKTC      x30                    Hip IR/ER neuro re-ed training   x6 mins B                                                  Other Therapeutic Activities:      Manual Treatments: Gr 4 LAD B hips followed by MWM B hip flexion. Hip IR/ER neuro re-ed training B.  B hip flexion MWM. Gr 3 lumbar mobs L1-3. STM B gluteals followed by piriformis stretching. Modalities:      Test/Measurements:    Observation: Standing:  R pelvic rotation. L pes planus. R bunion. L hand dominant but R shoulder depressed compared to L. Mild sway but decreased gluteal and corre activation with standing posture. Body Mechanics: SLS L with increased pelvic rotation R, hip IR, trendelenberg and LOB. R with mild LOB and hip IR. SLS squat L with increased hip IR, knee valgus, trendelenberg, LOB. R with LoB and knee valgus. Squat test: quad dominance, hip IR, K' valgus. AMB: increased L toeing out, decreased B hip extension, ERS lumbar spine. Hip IR B, trendelenberg B.     Spine ROM  Lumbar: Full flexion without pain. B SB decreased 50% with pain on R with R SB. Extension WNL with pain in the R. Rotation limited 50% with pain on R with R rotation. Joint Mobility  ROM RLE: AGMR R hip  ROM LLE: Hypomobility L hip joint, AGMR L hip     Strength RLE  Strength RLE: WNL  Comment: Hip ER 4-/5;  Hip IR 4-/5  Strength LLE  Strength LLE: WNL  Comment: Hip ER 4-/5; Hip IR 4-/5  Additional Measures  Flexibility: R hip ER 20 degrees, L hip IR 20 degrees. Special Tests: Prone hip extension test hamstring dominant. (-) clonus, (-) babinski. normal DTR B LEs, normal sensation B LEs. Denies B/B  Other: Hypomobility L Si joint       ASSESSMENT:    Pt presents with movement impairments AGMR B hips and ERS lumbar spine. Pt tolerated therex progression well today without increased pain. Tenderness along ilium B gluteals. 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      Plan: [x] Continue per plan of care [] Alter current plan (see comments)   [] Plan of care initiated [] Hold pending MD visit [] Discharge      Plan for Next Session:      Re-Certification Due Date:         Signature:  Johanna Morelos PT

## 2021-11-30 ENCOUNTER — HOSPITAL ENCOUNTER (OUTPATIENT)
Dept: PHYSICAL THERAPY | Age: 66
Setting detail: THERAPIES SERIES
Discharge: HOME | End: 2021-11-30
Payer: MEDICARE

## 2021-11-30 PROCEDURE — 97110 THERAPEUTIC EXERCISES: CPT

## 2021-11-30 PROCEDURE — 97140 MANUAL THERAPY 1/> REGIONS: CPT

## 2021-11-30 PROCEDURE — 97112 NEUROMUSCULAR REEDUCATION: CPT

## 2021-11-30 NOTE — FLOWSHEET NOTE
[facility], [facility], [address], [address]  Phone: [phone]  Fax [phone]    Physical Therapy Daily Treatment Note    Date:  2021    Patient Name:  Marcelino Kaminski    :  1955  MRN: [id number]  Restrictions/Precautions:  L4-5 fusion, retrolisthesis. Osteopeania  Medical/Treatment Diagnosis Information:  · Diagnosis: Hip Pain, Chronic LBP  Insurance/Certification information:  PT Insurance Information: Medicare  Physician Information:  Referring Practitioner: Pantera Dillard  Plan of care signed (Y/N):  N  Visit# / total visits:      G-Code (if applicable): Modified Oswestry  28% disability  LEFS    35% disability     Medicare Cap (if applicable):  643 = total amount used, updated 2021    Time in:  9:45    Timed Treatment: 45   Total Treatment Time:  45  Time out: 10:30  ________________________________________________________________________________________    Pain Level:    4-5/10  SUBJECTIVE:  Pt reports LBP on R side has 'quieted down' with rest over the holidays. Pt has held doing her exercises. L side sciatica is improved. At initial evaluation:  Pt c/o 'sciatic' pain in L gluteals that wouldn't go away over the past few months. Pt broke both wrists. Pt had injection 3 weeks ago and notes good improvement with regard to pain. Pt no longer limping. LBP persists. L4-L5 fusion a few years ago wt Dr. Bib Lewis. Decreased strength training during the pandemic the past 2 years. Osteopenia. Sitting for 1 hour will aggrave the LB. Driving that long will also aggravate the LB especially with sit/stand transfers. Standing ok but limited to about 1 hour due to LBP. Walking aggravates LBP within about 30 mins. Always has a pinching feel in the SI region and L gluteal.  No numnbess or tingling in the LEs. Pt shows dogs for a hobby. Knees are 'good. ACL repair 2x on L.   Bunions on B 4-/5  Strength LLE  Strength LLE: WNL  Comment: Hip ER 4-/5; Hip IR 4-/5  Additional Measures  Flexibility: R hip ER 20 degrees, L hip IR 20 degrees. Special Tests: Prone hip extension test hamstring dominant. (-) clonus, (-) babinski. normal DTR B LEs, normal sensation B LEs. Denies B/B  Other: Hypomobility L Si joint       ASSESSMENT:    Pt presents with movement impairments AGMR B hips and ERS lumbar spine. Pt tolerated therex progression well today without increased pain. Tenderness along ilium B gluteals. 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      Plan: [x] Continue per plan of care [] Alter current plan (see comments)   [] Plan of care initiated [] Hold pending MD visit [] Discharge      Plan for Next Session:      Re-Certification Due Date:         Signature:  Jarred Toth PT

## 2021-12-21 ENCOUNTER — APPOINTMENT (OUTPATIENT)
Dept: PHYSICAL THERAPY | Age: 66
End: 2021-12-21
Payer: MEDICARE

## 2021-12-23 ENCOUNTER — APPOINTMENT (OUTPATIENT)
Dept: PHYSICAL THERAPY | Age: 66
End: 2021-12-23
Payer: MEDICARE

## 2021-12-28 ENCOUNTER — APPOINTMENT (OUTPATIENT)
Dept: PHYSICAL THERAPY | Age: 66
End: 2021-12-28
Payer: MEDICARE

## 2021-12-30 ENCOUNTER — APPOINTMENT (OUTPATIENT)
Dept: PHYSICAL THERAPY | Age: 66
End: 2021-12-30
Payer: MEDICARE

## 2022-01-05 ENCOUNTER — APPOINTMENT (OUTPATIENT)
Dept: PHYSICAL THERAPY | Age: 67
End: 2022-01-05
Payer: MEDICARE

## 2022-01-07 ENCOUNTER — HOSPITAL ENCOUNTER (OUTPATIENT)
Dept: PHYSICAL THERAPY | Age: 67
Setting detail: THERAPIES SERIES
Discharge: HOME | End: 2022-01-07
Payer: MEDICARE

## 2022-01-10 ENCOUNTER — HOSPITAL ENCOUNTER (OUTPATIENT)
Dept: PHYSICAL THERAPY | Age: 67
Setting detail: THERAPIES SERIES
Discharge: HOME | End: 2022-01-10
Payer: MEDICARE

## 2022-01-12 ENCOUNTER — APPOINTMENT (OUTPATIENT)
Dept: PHYSICAL THERAPY | Age: 67
End: 2022-01-12
Payer: MEDICARE

## 2022-01-17 ENCOUNTER — APPOINTMENT (OUTPATIENT)
Dept: PHYSICAL THERAPY | Age: 67
End: 2022-01-17
Payer: MEDICARE

## 2022-01-19 ENCOUNTER — HOSPITAL ENCOUNTER (OUTPATIENT)
Dept: PHYSICAL THERAPY | Age: 67
Setting detail: THERAPIES SERIES
Discharge: HOME | End: 2022-01-19
Payer: MEDICARE

## 2022-01-19 PROCEDURE — 97112 NEUROMUSCULAR REEDUCATION: CPT

## 2022-01-19 PROCEDURE — 97140 MANUAL THERAPY 1/> REGIONS: CPT

## 2022-01-19 PROCEDURE — 97110 THERAPEUTIC EXERCISES: CPT

## 2022-01-19 NOTE — FLOWSHEET NOTE
[facility]  [facility]. and Therapy, [facility],  [address]  [facility], [address] [place] [address] Dr  Phone: [phone]  Fax [phone]    Physical Therapy Daily Treatment Note    Date:  2022    Patient Name:  Paul Soares    :  1955  MRN: [id number]  Restrictions/Precautions:  L4-5 fusion, retrolisthesis. Osteopeania  Medical/Treatment Diagnosis Information:  · Diagnosis: Hip Pain, Chronic LBP  Insurance/Certification information:  PT Insurance Information: Medicare  Physician Information:  Referring Practitioner: João Hernandez  Plan of care signed (Y/N):  N  Visit# / total visits:      G-Code (if applicable): Modified Oswestry  28% disability  LEFS    35% disability     Medicare Cap (if applicable):  069 = total amount used, updated 2022    Time in:  10:00    Timed Treatment: 45   Total Treatment Time:  45  Time out: 10:45  ________________________________________________________________________________________    Pain Level:    3/10  SUBJECTIVE:  Pt reports L sciatica persists but improved. Constant 'low nag' in the L sciatica. Increased pain with standing after sitting for a half hour or so. At initial evaluation:  Pt c/o 'sciatic' pain in L gluteals that wouldn't go away over the past few months. Pt broke both wrists. Pt had injection 3 weeks ago and notes good improvement with regard to pain. Pt no longer limping. LBP persists. L4-L5 fusion a few years ago wt Dr. Franky Villagomez. Decreased strength training during the pandemic the past 2 years. Osteopenia. Sitting for 1 hour will aggrave the LB. Driving that long will also aggravate the LB especially with sit/stand transfers. Standing ok but limited to about 1 hour due to LBP. Walking aggravates LBP within about 30 mins. Always has a pinching feel in the SI region and L gluteal.  No numnbess or tingling in the LEs. Pt shows dogs for a hobby. Knees are 'good. ACL repair 2x on L. Bunions on B feet. Surgery 20 years ago on L foot to remove bunion. R foot pain worse than L.  X-rays (+) for ddd lumbar spine. Possible retrolysthesis L5-S1. Weakness noted in the back and B LEs. OBJECTIVE:     Exercise/Equipment Resistance/Repetitions Other comments          TA sets with BP cuff        With march        With KFO   Until achieved  x10 B   x10 B  HEP video    Bridging with green TB  10x5 secs   HEP video    Clamshells with green TB  x10 B alt HEP video    Prone hip extension 10x5 secs B   HEP video    Lumbo pelvic stabilization   x2 mins  Multifidus    3 way ball compression   2x30 secs forward  10x5 secs diagonally    Sciatic flossing   x10 B  HEP          DKTC      x30             TG    x6 mins     Hip IR/ER neuro re-ed training   x4 mins B                                                  Other Therapeutic Activities:      Manual Treatments: Gr 4 LAD B hips followed by MWM B hip flexion. Hip IR/ER neuro re-ed training B.  B hip flexion MWM. STM B gluteals followed by piriformis stretching. L sciatic tracing and flossing prone. Modalities:      Test/Measurements:    Observation: Standing:  R pelvic rotation. L pes planus. R bunion. L hand dominant but R shoulder depressed compared to L. Mild sway but decreased gluteal and corre activation with standing posture. Body Mechanics: SLS L with increased pelvic rotation R, hip IR, trendelenberg and LOB. R with mild LOB and hip IR. SLS squat L with increased hip IR, knee valgus, trendelenberg, LOB. R with LoB and knee valgus. Squat test: quad dominance, hip IR, K' valgus. AMB: increased L toeing out, decreased B hip extension, ERS lumbar spine. Hip IR B, trendelenberg B.     Spine ROM  Lumbar: Full flexion without pain. B SB decreased 50% with pain on R with R SB. Extension WNL with pain in the R. Rotation limited 50% with pain on R with R rotation.   Joint Mobility  ROM RLE: AGMR R hip  ROM LLE: Hypomobility L hip joint, AGMR L hip     Strength RLE  Strength RLE: WNL  Comment: Hip ER 4-/5; Hip IR 4-/5  Strength LLE  Strength LLE: WNL  Comment: Hip ER 4-/5; Hip IR 4-/5  Additional Measures  Flexibility: R hip ER 20 degrees, L hip IR 20 degrees. Special Tests: Prone hip extension test hamstring dominant. (-) clonus, (-) babinski. normal DTR B LEs, normal sensation B LEs. Denies B/B  Other: Hypomobility L Si joint       ASSESSMENT:    Pt presents with movement impairments AGMR B hips and ERS lumbar spine. Pt tolerated therex progression well today without increased pain. Tenderness along ilium B gluteals. 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      Plan: [x] Continue per plan of care [] Alter current plan (see comments)   [] Plan of care initiated [] Hold pending MD visit [] Discharge      Plan for Next Session:      Re-Certification Due Date:         Signature:  Chavez Lopez PT

## 2022-01-21 ENCOUNTER — HOSPITAL ENCOUNTER (OUTPATIENT)
Dept: PHYSICAL THERAPY | Age: 67
Setting detail: THERAPIES SERIES
Discharge: HOME | End: 2022-01-21
Payer: MEDICARE

## 2022-01-21 PROCEDURE — 97110 THERAPEUTIC EXERCISES: CPT

## 2022-01-21 PROCEDURE — 97112 NEUROMUSCULAR REEDUCATION: CPT

## 2022-01-21 PROCEDURE — 97140 MANUAL THERAPY 1/> REGIONS: CPT

## 2022-01-21 NOTE — FLOWSHEET NOTE
[facility], [facility], [address], [address]  Phone: [phone]  Fax [phone]    Physical Therapy Daily Treatment Note    Date:  2022    Patient Name:  Shahriar Alba    :  1955  MRN: [id number]  Restrictions/Precautions:  L4-5 fusion, retrolisthesis. Osteopeania  Medical/Treatment Diagnosis Information:  · Diagnosis: Hip Pain, Chronic LBP  Insurance/Certification information:  PT Insurance Information: Medicare  Physician Information:  Referring Practitioner: Naty Pagan  Plan of care signed (Y/N):  N  Visit# / total visits:      G-Code (if applicable): Modified Oswestry  28% disability  LEFS    35% disability     Medicare Cap (if applicable):  672 = total amount used, updated 2022    Time in:  10:00    Timed Treatment: 45   Total Treatment Time:  45  Time out: 10:45  ________________________________________________________________________________________    Pain Level:    3/10  SUBJECTIVE:  Pt reports \"arthritis is acting up with the cold weather. \"       At initial evaluation:  Pt c/o 'sciatic' pain in L gluteals that wouldn't go away over the past few months. Pt broke both wrists. Pt had injection 3 weeks ago and notes good improvement with regard to pain. Pt no longer limping. LBP persists. L4-L5 fusion a few years ago wt Dr. David Perez. Decreased strength training during the pandemic the past 2 years. Osteopenia. Sitting for 1 hour will aggrave the LB. Driving that long will also aggravate the LB especially with sit/stand transfers. Standing ok but limited to about 1 hour due to LBP. Walking aggravates LBP within about 30 mins. Always has a pinching feel in the SI region and L gluteal.  No numnbess or tingling in the LEs. Pt shows dogs for a hobby. Knees are 'good. ACL repair 2x on L. Bunions on B feet. Surgery 20 years ago on L foot to remove bunion.   R foot pain worse than L.  X-rays (+) for ddd lumbar spine. Possible retrolysthesis L5-S1. Weakness noted in the back and B LEs. OBJECTIVE:     Exercise/Equipment Resistance/Repetitions Other comments          TA sets with dowel linh        With march        With KFO     x10 B   x10 B  HEP video    Bridging with green TB  10x5 secs   HEP video    Clamshells with green TB  x10 B alt HEP video    Prone hip extension 10x5 secs B   HEP video    Lumbo pelvic stabilization   x2 mins  Multifidus    3 way ball compression       Sciatic flossing   x10 B  HEP   Standing S' extension   x15 B maroon TB  HEP video    DKTC      x30     Multifidus  x15 B maroon TB  HEP video   TG    x6 mins     Hip IR/ER neuro re-ed training   x5 mins B                    Dowel linh training   x6 mins  HEP video                           Other Therapeutic Activities:      Manual Treatments: Gr 4 LAD B hips followed by MWM B hip flexion. Hip IR/ER neuro re-ed training B.  B hip flexion MWM. STM B gluteals followed by piriformis stretching. L sciatic tracing and flossing prone. Modalities:      Test/Measurements:    Observation: Standing:  R pelvic rotation. L pes planus. R bunion. L hand dominant but R shoulder depressed compared to L. Mild sway but decreased gluteal and corre activation with standing posture. Body Mechanics: SLS L with increased pelvic rotation R, hip IR, trendelenberg and LOB. R with mild LOB and hip IR. SLS squat L with increased hip IR, knee valgus, trendelenberg, LOB. R with LoB and knee valgus. Squat test: quad dominance, hip IR, K' valgus. AMB: increased L toeing out, decreased B hip extension, ERS lumbar spine. Hip IR B, trendelenberg B.     Spine ROM  Lumbar: Full flexion without pain. B SB decreased 50% with pain on R with R SB. Extension WNL with pain in the R. Rotation limited 50% with pain on R with R rotation.   Joint Mobility  ROM RLE: AGMR R hip  ROM LLE: Hypomobility L hip joint, AGMR L hip     Strength RLE  Strength RLE: WNL  Comment: Hip ER 4-/5; Hip IR 4-/5  Strength LLE  Strength LLE: WNL  Comment: Hip ER 4-/5; Hip IR 4-/5  Additional Measures  Flexibility: R hip ER 20 degrees, L hip IR 20 degrees. Special Tests: Prone hip extension test hamstring dominant. (-) clonus, (-) babinski. normal DTR B LEs, normal sensation B LEs. Denies B/B  Other: Hypomobility L Si joint       ASSESSMENT:    Pt presents with movement impairments AGMR B hips and ERS lumbar spine. Pt tolerated therex progression well today without increased pain. Tenderness along ilium B gluteals. 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      Plan: [x] Continue per plan of care [] Alter current plan (see comments)   [] Plan of care initiated [] Hold pending MD visit [] Discharge      Plan for Next Session:      Re-Certification Due Date:         Signature:  Ninfa Gallardo, PT

## 2022-01-24 ENCOUNTER — HOSPITAL ENCOUNTER (OUTPATIENT)
Dept: PHYSICAL THERAPY | Age: 67
Setting detail: THERAPIES SERIES
Discharge: HOME | End: 2022-01-24
Payer: MEDICARE

## 2022-01-24 PROCEDURE — 97110 THERAPEUTIC EXERCISES: CPT

## 2022-01-24 PROCEDURE — 97112 NEUROMUSCULAR REEDUCATION: CPT

## 2022-01-24 PROCEDURE — 97140 MANUAL THERAPY 1/> REGIONS: CPT

## 2022-01-24 NOTE — FLOWSHEET NOTE
[facility], [place] [address] [address]  9 [facility], [address] [place] [address] Dr  Phone: [phone]  Fax [phone]    Physical Therapy Daily Treatment Note    Date:  2022    Patient Name:  Henri Avendano    :  1955  MRN: [id number]  Restrictions/Precautions:  L4-5 fusion, retrolisthesis. Osteopeania  Medical/Treatment Diagnosis Information:  · Diagnosis: Hip Pain, Chronic LBP  Insurance/Certification information:  PT Insurance Information: Medicare  Physician Information:  Referring Practitioner: Johannah Goldmann  Plan of care signed (Y/N):  N  Visit# / total visits:      G-Code (if applicable): Modified Oswestry  28% disability  LEFS    35% disability     Medicare Cap (if applicable):  786 = total amount used, updated 2022    Time in:  10:00    Timed Treatment: 45   Total Treatment Time:  45  Time out: 10:45  ________________________________________________________________________________________    Pain Level:    4-510  SUBJECTIVE:  Pt reports 'increased stiffness today with the cold weather.'       At initial evaluation:  Pt c/o 'sciatic' pain in L gluteals that wouldn't go away over the past few months. Pt broke both wrists. Pt had injection 3 weeks ago and notes good improvement with regard to pain. Pt no longer limping. LBP persists. L4-L5 fusion a few years ago [facility] Dr. Madeline Mace. Decreased strength training during the pandemic the past 2 years. Osteopenia. Sitting for 1 hour will aggrave the LB. Driving that long will also aggravate the LB especially with sit/stand transfers. Standing ok but limited to about 1 hour due to LBP. Walking aggravates LBP within about 30 mins. Always has a pinching feel in the SI region and L gluteal.  No numnbess or tingling in the LEs. Pt shows dogs for a hobby. Knees are 'good. ACL repair 2x on L. Bunions on B feet. Surgery 20 years ago on L foot to remove bunion.   R foot pain worse than L.  X-rays (+) for ddd lumbar spine. Possible retrolysthesis L5-S1. Weakness noted in the back and B LEs. OBJECTIVE:     Exercise/Equipment Resistance/Repetitions Other comments          TA sets with dowel linh        With march        With KFO     x10 B   x10 B  HEP video    Bridging with green TB  10x5 secs   HEP video    Clamshells with green TB  x10 B alt HEP video    Prone hip extension    HEP video    Lumbo pelvic stabilization   x2 mins  Multifidus    3 way ball compression       Sciatic flossing   x10 B  HEP   Standing S' extension   x15 B maroon TB  HEP video    DKTC      x20     Multifidus  x15 B maroon TB  HEP video   TG    x6 mins     Hip IR/ER neuro re-ed training   x5 mins B             Rockerboard    x1 min F/B M/L   x1 min rocking each     Dowel linh training     HEP video                           Other Therapeutic Activities:      Manual Treatments: Gr 4 LAD B hips followed by MWM B hip flexion. Hip IR/ER neuro re-ed training B.  B hip flexion MWM. STM B gluteals followed by piriformis stretching. L sciatic tracing and flossing prone. Modalities:      Test/Measurements:    Observation: Standing:  R pelvic rotation. L pes planus. R bunion. L hand dominant but R shoulder depressed compared to L. Mild sway but decreased gluteal and corre activation with standing posture. Body Mechanics: SLS L with increased pelvic rotation R, hip IR, trendelenberg and LOB. R with mild LOB and hip IR. SLS squat L with increased hip IR, knee valgus, trendelenberg, LOB. R with LoB and knee valgus. Squat test: quad dominance, hip IR, K' valgus. AMB: increased L toeing out, decreased B hip extension, ERS lumbar spine. Hip IR B, trendelenberg B.     Spine ROM  Lumbar: Full flexion without pain. B SB decreased 50% with pain on R with R SB. Extension WNL with pain in the R. Rotation limited 50% with pain on R with R rotation.   Joint Mobility  ROM RLE: AGMR R hip  ROM LLE: Hypomobility L hip joint, AGMR L hip     Strength RLE  Strength RLE: WNL  Comment: Hip ER 4-/5; Hip IR 4-/5  Strength LLE  Strength LLE: WNL  Comment: Hip ER 4-/5; Hip IR 4-/5  Additional Measures  Flexibility: R hip ER 20 degrees, L hip IR 20 degrees. Special Tests: Prone hip extension test hamstring dominant. (-) clonus, (-) babinski. normal DTR B LEs, normal sensation B LEs. Denies B/B  Other: Hypomobility L Si joint       ASSESSMENT:    Pt presents with movement impairments AGMR B hips and ERS lumbar spine. Pt tolerated therex progression well today without increased pain. Tenderness along ilium B gluteals. 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      Plan: [x] Continue per plan of care [] Alter current plan (see comments)   [] Plan of care initiated [] Hold pending MD visit [] Discharge      Plan for Next Session:      Re-Certification Due Date:         Signature:  Radha Renae PT

## 2022-01-26 ENCOUNTER — HOSPITAL ENCOUNTER (OUTPATIENT)
Dept: PHYSICAL THERAPY | Age: 67
Setting detail: THERAPIES SERIES
Discharge: HOME | End: 2022-01-26
Payer: MEDICARE

## 2022-01-26 PROCEDURE — 97140 MANUAL THERAPY 1/> REGIONS: CPT

## 2022-01-26 PROCEDURE — 97112 NEUROMUSCULAR REEDUCATION: CPT

## 2022-01-26 PROCEDURE — 97110 THERAPEUTIC EXERCISES: CPT

## 2022-01-26 NOTE — FLOWSHEET NOTE
[place]  [facility]. and Therapy, [facility], [address], [address]  Phone: [phone]  Fax [phone]    Physical Therapy Daily Treatment Note    Date:  2022    Patient Name:  Shlelie Menjivar    :  1955  MRN: [id number]  Restrictions/Precautions:  L4-5 fusion, retrolisthesis. Osteopeania  Medical/Treatment Diagnosis Information:  · Diagnosis: Hip Pain, Chronic LBP  Insurance/Certification information:  PT Insurance Information: Medicare  Physician Information:  Referring Practitioner: Chasity Fishman  Plan of care signed (Y/N):  N  Visit# / total visits:      G-Code (if applicable): Modified Oswestry  28% disability  LEFS    35% disability     Medicare Cap (if applicable):  981 = total amount used, updated 2022    Time in:  10:00    Timed Treatment: 45   Total Treatment Time:  45  Time out: 10:45  ________________________________________________________________________________________    Pain Level:   3-4/10  SUBJECTIVE:  Pt reports feeling 'really good' after last session. At initial evaluation:  Pt c/o 'sciatic' pain in L gluteals that wouldn't go away over the past few months. Pt broke both wrists. Pt had injection 3 weeks ago and notes good improvement with regard to pain. Pt no longer limping. LBP persists. L4-L5 fusion a few years ago [facility] Dr. Hesham Wilkins. Decreased strength training during the pandemic the past 2 years. Osteopenia. Sitting for 1 hour will aggrave the LB. Driving that long will also aggravate the LB especially with sit/stand transfers. Standing ok but limited to about 1 hour due to LBP. Walking aggravates LBP within about 30 mins. Always has a pinching feel in the SI region and L gluteal.  No numnbess or tingling in the LEs. Pt shows dogs for a hobby. Knees are 'good. ACL repair 2x on L. Bunions on B feet. Surgery 20 years ago on L foot to remove bunion.   R foot pain worse than L. X-rays (+) for ddd lumbar spine. Possible retrolysthesis L5-S1. Weakness noted in the back and B LEs. OBJECTIVE:     Exercise/Equipment Resistance/Repetitions Other comments          TA sets with dowel linh        With march        With KFO        x10 B  HEP video    Bridging  10x5 secs   HEP video    Clamshells with green TB   HEP video    Prone hip extension    HEP video    Lumbo pelvic stabilization   x2 mins  Multifidus    3 way ball compression   2x30 secs forward  10x5 secs diagonally     Sciatic flossing   x10 B  HEP   Standing S' extension   x15 B maroon TB  HEP video    DKTC      x20     Multifidus  x15 B maroon TB  HEP video   TG    x6 mins     Hip IR/ER neuro re-ed training                Rockerboard    x1 min F/B M/L   x1 min rocking each     Dowel linh training     HEP video    Lateral stepping with green TB    15ft x6  x15 steamboats    Posterior sling    4# x15 B maroon TB              Other Therapeutic Activities:      Manual Treatments:  MWM B hip flexion. Hip IR/ER neuro re-ed training B.  B hip flexion MWM. STM B gluteals followed by piriformis stretching. L sciatic tracing and flossing prone. Modalities:      Test/Measurements:    Observation: Standing:  R pelvic rotation. L pes planus. R bunion. L hand dominant but R shoulder depressed compared to L. Mild sway but decreased gluteal and corre activation with standing posture. Body Mechanics: SLS L with increased pelvic rotation R, hip IR, trendelenberg and LOB. R with mild LOB and hip IR. SLS squat L with increased hip IR, knee valgus, trendelenberg, LOB. R with LoB and knee valgus. Squat test: quad dominance, hip IR, K' valgus. AMB: increased L toeing out, decreased B hip extension, ERS lumbar spine. Hip IR B, trendelenberg B.     Spine ROM  Lumbar: Full flexion without pain. B SB decreased 50% with pain on R with R SB. Extension WNL with pain in the R. Rotation limited 50% with pain on R with R rotation.   Joint Mobility  ROM RLE: AGMR R hip  ROM LLE: Hypomobility L hip joint, AGMR L hip     Strength RLE  Strength RLE: WNL  Comment: Hip ER 4-/5; Hip IR 4-/5  Strength LLE  Strength LLE: WNL  Comment: Hip ER 4-/5; Hip IR 4-/5  Additional Measures  Flexibility: R hip ER 20 degrees, L hip IR 20 degrees. Special Tests: Prone hip extension test hamstring dominant. (-) clonus, (-) babinski. normal DTR B LEs, normal sensation B LEs. Denies B/B  Other: Hypomobility L Si joint       ASSESSMENT:    Pt presents with movement impairments AGMR B hips and ERS lumbar spine. Pt tolerated therex progression well today without increased pain. 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      Plan: [x] Continue per plan of care [] Alter current plan (see comments)   [] Plan of care initiated [] Hold pending MD visit [] Discharge      Plan for Next Session:      Re-Certification Due Date:         Signature:  Luna Taveras PT

## 2022-01-31 ENCOUNTER — HOSPITAL ENCOUNTER (OUTPATIENT)
Dept: PHYSICAL THERAPY | Age: 67
Setting detail: THERAPIES SERIES
Discharge: HOME | End: 2022-01-31
Payer: MEDICARE

## 2022-01-31 PROCEDURE — 97112 NEUROMUSCULAR REEDUCATION: CPT

## 2022-01-31 PROCEDURE — 97110 THERAPEUTIC EXERCISES: CPT

## 2022-01-31 PROCEDURE — 97140 MANUAL THERAPY 1/> REGIONS: CPT

## 2022-01-31 NOTE — FLOWSHEET NOTE
[facility], [facility], [address], [address]   Phone: [phone]  Fax [phone]    Physical Therapy Daily Treatment Note    Date:  2022    Patient Name:  Melinda West    :  1955  MRN: [id number]  Restrictions/Precautions:  L4-5 fusion, retrolisthesis. Osteopeania  Medical/Treatment Diagnosis Information:  · Diagnosis: Hip Pain, Chronic LBP  Insurance/Certification information:  PT Insurance Information: Medicare  Physician Information:  Referring Practitioner: Joao Valencia  Plan of care signed (Y/N):  N  Visit# / total visits:  10/12    G-Code (if applicable): Modified Oswestry  28% disability  LEFS    35% disability     Medicare Cap (if applicable):  563 = total amount used, updated 2022    Time in:  10:00    Timed Treatment: 45   Total Treatment Time:  45  Time out: 10:45  ________________________________________________________________________________________    Pain Level:   3/10  SUBJECTIVE:  Pt reports feeling 'really good' again after last session. Pt notes cold weather and arthritis 'got to me over the weekend'. LBP this morning 1-2/10      At initial evaluation:  Pt c/o 'sciatic' pain in L gluteals that wouldn't go away over the past few months. Pt broke both wrists. Pt had injection 3 weeks ago and notes good improvement with regard to pain. Pt no longer limping. LBP persists. L4-L5 fusion a few years ago wt Dr. Judie Ocampo. Decreased strength training during the pandemic the past 2 years. Osteopenia. Sitting for 1 hour will aggrave the LB. Driving that long will also aggravate the LB especially with sit/stand transfers. Standing ok but limited to about 1 hour due to LBP. Walking aggravates LBP within about 30 mins. Always has a pinching feel in the SI region and L gluteal.  No numnbess or tingling in the LEs. Pt shows dogs for a hobby. Knees are 'good. ACL repair 2x on L. Bunions on B feet. Surgery 20 years ago on L foot to remove bunion. R foot pain worse than L.  X-rays (+) for ddd lumbar spine. Possible retrolysthesis L5-S1. Weakness noted in the back and B LEs. OBJECTIVE:     Exercise/Equipment Resistance/Repetitions Other comments          TA sets with dowel linh        With march        With KFO        x10 B  HEP video    Bridging  10x5 secs   HEP video    Clamshells with green TB   HEP video    Prone hip extension    HEP video    Lumbo pelvic stabilization   x2 mins  Multifidus    3 way ball compression   2x30 secs forward  10x5 secs diagonally     Sciatic flossing   x10 B  HEP   Standing S' extension   x15 B maroon TB  HEP video    DKTC      x20     Multifidus  x15 B maroon TB  HEP video   TG    x6 mins     Hip IR/ER neuro re-ed training         Multifidus isometric with doorway   x3 mins  HEP    Rockerboard    x1 min F/B M/L   x1 min rocking each     Dowel linh training     HEP video    Lateral stepping with green TB    15ft x6  x15 steamboats    Posterior sling    4\"      Minisquats   NV       Other Therapeutic Activities:      Manual Treatments: Gr 4 LAD L hip followed by MWM hip flexion. L Hip IR/ER neuro re-ed training. B hip flexion MWM. MET B innominates. .            Modalities:      Test/Measurements:    Observation: Standing:  R pelvic rotation. L pes planus. R bunion. L hand dominant but R shoulder depressed compared to L. Mild sway but decreased gluteal and corre activation with standing posture. Body Mechanics: SLS L with increased pelvic rotation R, hip IR, trendelenberg and LOB. R with mild LOB and hip IR. SLS squat L with increased hip IR, knee valgus, trendelenberg, LOB. R with LoB and knee valgus. Squat test: quad dominance, hip IR, K' valgus. AMB: increased L toeing out, decreased B hip extension, ERS lumbar spine. Hip IR B, trendelenberg B.     Spine ROM  Lumbar: Full flexion without pain.   B SB decreased 50% with pain on R with R SB. Extension WNL with pain in the R. Rotation limited 50% with pain on R with R rotation. Joint Mobility  ROM RLE: AGMR R hip  ROM LLE: Hypomobility L hip joint, AGMR L hip     Strength RLE  Strength RLE: WNL  Comment: Hip ER 4-/5; Hip IR 4-/5  Strength LLE  Strength LLE: WNL  Comment: Hip ER 4-/5; Hip IR 4-/5  Additional Measures  Flexibility: R hip ER 20 degrees, L hip IR 20 degrees. Special Tests: Prone hip extension test hamstring dominant. (-) clonus, (-) babinski. normal DTR B LEs, normal sensation B LEs. Denies B/B  Other: Hypomobility L Si joint       ASSESSMENT:    Pt presents with movement impairments AGMR B hips and ERS lumbar spine. Pt tolerated therex progression well today without increased pain. 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      Plan: [x] Continue per plan of care [] Alter current plan (see comments)   [] Plan of care initiated [] Hold pending MD visit [] Discharge      Plan for Next Session:      Re-Certification Due Date:         Signature:  Cheryl Del Cid, PT

## 2022-02-02 ENCOUNTER — APPOINTMENT (OUTPATIENT)
Dept: PHYSICAL THERAPY | Age: 67
End: 2022-02-02
Payer: MEDICARE

## 2022-02-10 ENCOUNTER — HOSPITAL ENCOUNTER (OUTPATIENT)
Dept: PHYSICAL THERAPY | Age: 67
Setting detail: THERAPIES SERIES
Discharge: HOME | End: 2022-02-10
Payer: MEDICARE

## 2022-02-10 PROCEDURE — 97110 THERAPEUTIC EXERCISES: CPT

## 2022-02-10 PROCEDURE — 97140 MANUAL THERAPY 1/> REGIONS: CPT

## 2022-02-10 PROCEDURE — 97112 NEUROMUSCULAR REEDUCATION: CPT

## 2022-02-10 NOTE — FLOWSHEET NOTE
[facility], [facility], [address], [address] [place]   Phone: [phone]  Fax [phone]    Physical Therapy Daily Treatment Note    Date:  2/10/2022    Patient Name:  Sagar Reardon    :  1955  MRN: [id number]  Restrictions/Precautions:  L4-5 fusion, retrolisthesis. Osteopeania  Medical/Treatment Diagnosis Information:  · Diagnosis: Hip Pain, Chronic LBP  Insurance/Certification information:  PT Insurance Information: Medicare  Physician Information:  Referring Practitioner: Teresita Vera  Plan of care signed (Y/N):  N  Visit# / total visits:      G-Code (if applicable): Modified Oswestry  28% disability  LEFS    35% disability     Medicare Cap (if applicable):  740 = total amount used, updated 2/10/2022    Time in:  10:30    Timed Treatment: 45   Total Treatment Time:  45  Time out: 11:15  ________________________________________________________________________________________    Pain Level:   3/10  SUBJECTIVE:  Pt reports feeling 'really good' again after last session. Painful/instability in the L hip with initial WB getting out of the car. Pt feels cold weather and arthritis are aggravating her hip. At initial evaluation:  Pt c/o 'sciatic' pain in L gluteals that wouldn't go away over the past few months. Pt broke both wrists. Pt had injection 3 weeks ago and notes good improvement with regard to pain. Pt no longer limping. LBP persists. L4-L5 fusion a few years ago [facility] Dr. Marquise Connelly. Decreased strength training during the pandemic the past 2 years. Osteopenia. Sitting for 1 hour will aggrave the LB. Driving that long will also aggravate the LB especially with sit/stand transfers. Standing ok but limited to about 1 hour due to LBP. Walking aggravates LBP within about 30 mins. Always has a pinching feel in the SI region and L gluteal.  No numnbess or tingling in the LEs. Pt shows dogs for a hobby. Knees are 'good. ACL repair 2x on L. Bunions on B feet. Surgery 20 years ago on L foot to remove bunion. R foot pain worse than L.  X-rays (+) for ddd lumbar spine. Possible retrolysthesis L5-S1. Weakness noted in the back and B LEs. OBJECTIVE:     Exercise/Equipment Resistance/Repetitions Other comments   Supine hip flexion neuro re-ed training   x2 mins with TB     TA sets with dowel linh        With march        With KFO          HEP video    Bridging  10x5 secs   HEP video    Clamshells with green TB   HEP video    Prone hip extension    HEP video    Lumbo pelvic stabilization    Multifidus    3 way ball compression   2x30 secs forward  10x5 secs diagonally     Sciatic flossing    HEP   Standing S' extension   x15 B maroon TB  HEP video    DKTC      x30     Multifidus  x15 B maroon TB  HEP video   TG    x6 mins     Hip IR/ER neuro re-ed training         Multifidus isometric with doorway   x3 mins  HEP    Rockerboard    x1 min F/B M/L   x1 min rocking each     Dowel linh training     HEP video    Lateral stepping with green TB        Posterior sling    4\"      Stacking with MB 2x30 secs B     Minisquats   NV       Other Therapeutic Activities:      Manual Treatments: Gr 4 LAD L hip followed by MWM hip flexion. L Hip IR/ER neuro re-ed training. STM L iliopsoas and iliacus followed by manual stretching. ART L ITB followed by manual stretching and long axis hip IR/ER mobilizations. .            Modalities:      Test/Measurements:    Observation: Standing:  R pelvic rotation. L pes planus. R bunion. L hand dominant but R shoulder depressed compared to L. Mild sway but decreased gluteal and corre activation with standing posture. Body Mechanics: SLS L with increased pelvic rotation R, hip IR, trendelenberg and LOB. R with mild LOB and hip IR. SLS squat L with increased hip IR, knee valgus, trendelenberg, LOB. R with LoB and knee valgus. Squat test: quad dominance, hip IR, K' valgus.   AMB: increased L toeing out, decreased B hip extension, ERS lumbar spine. Hip IR B, trendelenberg B.     Spine ROM  Lumbar: Full flexion without pain. B SB decreased 50% with pain on R with R SB. Extension WNL with pain in the R. Rotation limited 50% with pain on R with R rotation. Joint Mobility  ROM RLE: AGMR R hip  ROM LLE: Hypomobility L hip joint, AGMR L hip     Strength RLE  Strength RLE: WNL  Comment: Hip ER 4-/5; Hip IR 4-/5  Strength LLE  Strength LLE: WNL  Comment: Hip ER 4-/5; Hip IR 4-/5  Additional Measures  Flexibility: R hip ER 20 degrees, L hip IR 20 degrees. Special Tests: Prone hip extension test hamstring dominant. (-) clonus, (-) babinski. normal DTR B LEs, normal sensation B LEs. Denies B/B  Other: Hypomobility L Si joint       ASSESSMENT:    Pt presents with movement impairments AGMR B hips and ERS lumbar spine. Pt tolerated therex progression well today without increased pain. 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      Plan: [x] Continue per plan of care [] Alter current plan (see comments)   [] Plan of care initiated [] Hold pending MD visit [] Discharge      Plan for Next Session:      Re-Certification Due Date:         Signature:  Maggie Verdugo, PT

## 2022-02-15 ENCOUNTER — HOSPITAL ENCOUNTER (OUTPATIENT)
Dept: PHYSICAL THERAPY | Age: 67
Setting detail: THERAPIES SERIES
Discharge: HOME | End: 2022-02-15
Payer: MEDICARE

## 2022-02-15 PROCEDURE — 97140 MANUAL THERAPY 1/> REGIONS: CPT

## 2022-02-15 PROCEDURE — 97110 THERAPEUTIC EXERCISES: CPT

## 2022-02-15 PROCEDURE — 97112 NEUROMUSCULAR REEDUCATION: CPT

## 2022-02-15 NOTE — FLOWSHEET NOTE
[facility]. and Therapy, [facility], [address], [address]  Phone: [phone]  Fax [phone]    Physical Therapy Daily Treatment Note    Date:  2/15/2022    Patient Name:  Mohini Garnica    :  1955  MRN: [id number]  Restrictions/Precautions:  L4-5 fusion, retrolisthesis. Osteopeania  Medical/Treatment Diagnosis Information:  · Diagnosis: Hip Pain, Chronic LBP  Insurance/Certification information:  PT Insurance Information: Medicare  Physician Information:  Referring Practitioner: Cassandra Morris  Plan of care signed (Y/N):  N  Visit# / total visits:      G-Code (if applicable): Modified Oswestry     2/15/22 28% disability    At initial evaluation 28% disability  LEFS   2/15/22 20% disability     At initial evaluation 35% disability     Medicare Cap (if applicable):  3661 = total amount used, updated 2/15/2022    Time in:  10:30    Timed Treatment: 45   Total Treatment Time:  45  Time out: 11:15  ________________________________________________________________________________________    Pain Level:   3/10  SUBJECTIVE:  Pt reports feeling 'really good' again after last session. Painful/instability in the L hip with initial WB getting out of the car. Pt feels cold weather and arthritis are aggravating her hip. Overall, improved with therapy but 'not returned to PLOF'. Currently, 90% PLOF. Pt continues to 'push through' the pain with activity and can do everything but has pain in the hip. Pt admits to only doing HEP 1x/week.       OBJECTIVE:     Exercise/Equipment Resistance/Repetitions Other comments   Supine hip flexion neuro re-ed training   x2 mins with TB     TA sets with dowel linh        With march        With KFO          HEP video    Bridging  10x5 secs   HEP video    Clamshells with green TB   HEP video    Prone hip extension    HEP video    Lumbo pelvic stabilization    Multifidus    3 way ball compression   2x30 secs forward  10x5 secs diagonally     Sciatic flossing    HEP   Standing S' extension    HEP video    DKTC      x30     Multifidus   HEP video   TG    x6 mins     Hip IR/ER neuro re-ed training         Multifidus isometric with doorway     HEP    Rockerboard    x1 min F/B M/L   x1 min rocking each     Dowel linh training     HEP video    Lateral stepping with green TB        Posterior sling    4\"      Stacking with MB 2x30 secs B     Minisquats   NV       Other Therapeutic Activities:      Manual Treatments: Gr 4 LAD L hip followed by MWM hip flexion. L Hip IR/ER neuro re-ed training. STM L iliopsoas and iliacus followed by manual stretching. ART L ITB followed by manual stretching and long axis hip IR/ER mobilizations. .            Modalities:      Test/Measurements:    Observation: Standing:  R pelvic rotation. L pes planus. R bunion. L hand dominant but R shoulder depressed compared to L. Mild sway but decreased gluteal and corre activation with standing posture. Body Mechanics: SLS L with increased pelvic rotation R, hip IR, trendelenberg and LOB. R with mild LOB and hip IR. SLS squat L with increased hip IR, knee valgus, trendelenberg, LOB. R with LoB and knee valgus. Squat test: quad dominance, hip IR, K' valgus. AMB: increased L toeing out, decreased B hip extension, ERS lumbar spine. Hip IR B, trendelenberg B.     Spine ROM  Lumbar: Full flexion without pain. B SB decreased 50% with pain on R with R SB. Extension WNL with pain in the R. Rotation limited 50% with pain on R with R rotation. Joint Mobility  ROM RLE: AGMR R hip  ROM LLE: Hypomobility L hip joint, AGMR L hip     Strength RLE  Strength RLE: WNL  Comment: Hip ER 4-/5; Hip IR 4-/5  Strength LLE  Strength LLE: WNL  Comment: Hip ER 4-/5; Hip IR 4-/5  Additional Measures  Flexibility: R hip ER 20 degrees, L hip IR 20 degrees. Special Tests: Prone hip extension test hamstring dominant. (-) clonus, (-) babinski.   normal DTR B LEs, normal sensation B LEs. Denies B/B  Other: Hypomobility L Si joint       ASSESSMENT:    Pt continues to present with movement impairments AGMR B hips and ERS lumbar spine. Pt responding well to therapy with decreased pain but not consistent with HEP to maintain gains. Instructed pt on importance of HEP consistency and pt agrees to do them daily going forward. Recommend additional 2x/wk for 4 weeks to progress POC with the condition that pt is consistent with HEP.          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

## 2022-02-15 NOTE — PROGRESS NOTES
[facility], [facility], [address], [address]  Phone: [phone]  Fax [phone]    Physical Therapy Daily Treatment Note    Date:  2/15/2022    Patient Name:  Anshu Adams    :  1955  MRN: [id number]  Restrictions/Precautions:  L4-5 fusion, retrolisthesis. Osteopeania  Medical/Treatment Diagnosis Information:  · Diagnosis: Hip Pain, Chronic LBP  Insurance/Certification information:  PT Insurance Information: Medicare  Physician Information:  Referring Practitioner: Chasity Weiss  Plan of care signed (Y/N):  N  Visit# / total visits:      G-Code (if applicable): Modified Oswestry     2/15/22 28% disability    At initial evaluation 28% disability  LEFS   2/15/22 20% disability     At initial evaluation 35% disability     Medicare Cap (if applicable):  8502 = total amount used, updated 2/15/2022    Time in:  10:30    Timed Treatment: 45   Total Treatment Time:  45  Time out: 11:15  ________________________________________________________________________________________    Pain Level:   3/10  SUBJECTIVE:  Pt reports feeling 'really good' again after last session. Painful/instability in the L hip with initial WB getting out of the car. Pt feels cold weather and arthritis are aggravating her hip. Overall, improved with therapy but 'not returned to PLOF'. Currently, 90% PLOF. Pt continues to 'push through' the pain with activity and can do everything but has pain in the hip. Pt admits to only doing HEP 1x/week. OBJECTIVE:     Test/Measurements:    Observation: Standing:  R pelvic rotation. L pes planus. R bunion. L hand dominant but R shoulder depressed compared to L. Mild sway but decreased gluteal and corre activation with standing posture. Body Mechanics: SLS L with increased pelvic rotation R, hip IR, trendelenberg and LOB. R with mild LOB and hip IR.  SLS squat L with increased hip IR, knee valgus, trendelenberg, LOB. R with LoB and knee valgus. Squat test: quad dominance, hip IR, K' valgus. AMB: increased L toeing out, decreased B hip extension, ERS lumbar spine. Hip IR B, trendelenberg B.     Spine ROM  Lumbar: Full flexion without pain. B SB decreased 50% with pain on R with R SB. Extension WNL with pain in the R. Rotation limited 50% with pain on R with R rotation. Joint Mobility  ROM RLE: AGMR R hip  ROM LLE: Hypomobility L hip joint, AGMR L hip     Strength RLE  Strength RLE: WNL  Comment: Hip ER 4-/5; Hip IR 4-/5  Strength LLE  Strength LLE: WNL  Comment: Hip ER 4-/5; Hip IR 4-/5  Additional Measures  Flexibility: R hip ER 20 degrees, L hip IR 20 degrees. Special Tests: Prone hip extension test hamstring dominant. (-) clonus, (-) babinski. normal DTR B LEs, normal sensation B LEs. Denies B/B  Other: Hypomobility L Si joint       ASSESSMENT:    Pt continues to present with movement impairments AGMR B hips and ERS lumbar spine. Pt responding well to therapy with decreased pain but not consistent with HEP to maintain gains. Instructed pt on importance of HEP consistency and pt agrees to do them daily going forward. Recommend additional 2x/wk for 4 weeks to progress POC with the condition that pt is consistent with HEP.          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

## 2022-02-15 NOTE — PROGRESS NOTES
Outpatient Physical Therapy  Phone: [phone] Fax: [phone]     To: Naty Pagan      From: Winsome Perez PT   Patient: Shahriar Alba       : 1955  Diagnosis: Hip Pain, Chronic LBP    Date: 2/15/2022  Treatment Diagnosis:      Physical Therapy Progress/Discharge Note    Total Visits to date:     Cancels/No-shows to date:      Plan of Care/Treatment to date:  [x] Therapeutic Exercise    [] Modalities:  [x] Therapeutic Activity     [] Ultrasound   [] Electrical Stimulation   [x] Gait Training      [] Cervical Traction   [] Lumbar Traction  [x] Neuromuscular Re-education  [] Cold/hotpack  [] Iontophoresis  [x] Instruction in HEP      Other:  [x] Manual Therapy       []                                 [] Aquatic Therapy       []                                     Progress towards goals:  See progress note      Frequency/Duration:  # Days per week: [] 1 day # Weeks: [] 1 week [x] 4 weeks      [x] 2 days   [] 2 weeks [] 5 weeks     [] 3 days   [] 3 weeks [] 6 weeks     Rehab Potential: [] Excellent [x] Good [x] Fair  [] Poor     Goal Status:  [] Achieved [x] Partially Achieved  [] Not Achieved     Patient Status: [] Continue per initial plan of Care     [] Patient now discharged     [x] Additional visits requested, Please re-certify for additional visits:      Requested frequency/duration:  2X/week for 4 weeks    Electronically signed by:  Winsome Perez PT, PT    If you have any questions or concerns, please don't hesitate to call.   Thank you for your referral.    Physician Signature:________________________________Date:__________________  By signing above, therapists plan is approved by physician

## 2022-02-17 ENCOUNTER — HOSPITAL ENCOUNTER (OUTPATIENT)
Dept: PHYSICAL THERAPY | Age: 67
Setting detail: THERAPIES SERIES
Discharge: HOME | End: 2022-02-17
Payer: MEDICARE

## 2022-02-17 PROCEDURE — 97140 MANUAL THERAPY 1/> REGIONS: CPT

## 2022-02-17 PROCEDURE — 97112 NEUROMUSCULAR REEDUCATION: CPT

## 2022-02-17 PROCEDURE — 97110 THERAPEUTIC EXERCISES: CPT

## 2022-02-17 NOTE — FLOWSHEET NOTE
[facility], [facility], [address], [address]  Phone: [phone]  Fax [phone]    Physical Therapy Daily Treatment Note    Date:  2022    Patient Name:  Marta Smith    :  1955  MRN: [id number]  Restrictions/Precautions:  L4-5 fusion, retrolisthesis. Osteopeania  Medical/Treatment Diagnosis Information:  · Diagnosis: Hip Pain, Chronic LBP  Insurance/Certification information:  PT Insurance Information: Medicare  Physician Information:  Referring Practitioner: Darnell Velez  Plan of care signed (Y/N):  N  Visit# / total visits:      G-Code (if applicable): Modified Oswestry     2/15/22 28% disability    At initial evaluation 28% disability  LEFS   2/15/22 20% disability     At initial evaluation 35% disability     Medicare Cap (if applicable):  6071 = total amount used, updated 2022    Time in:  10:30    Timed Treatment: 45   Total Treatment Time:  45  Time out: 11:15  ________________________________________________________________________________________    Pain Level:   2/10  SUBJECTIVE:  Pt notes the back is feeling better and not hurting her but the L lateral hip has pain after getting out of the car after long drives.         OBJECTIVE:     Exercise/Equipment Resistance/Repetitions Other comments   Supine hip flexion neuro re-ed training        TA sets with dowel linh        With march        With KFO          HEP video    Bridging  10x5 secs   HEP video    Clamshells with green TB   HEP video    Prone hip extension 10x5 secs B    HEP video    Lumbo pelvic stabilization    Multifidus    3 way ball compression   2x30 secs forward  10x5 secs diagonally     Sciatic flossing    HEP   Standing S' extension    HEP video    DKTC      x30     Multifidus   HEP video   TG    x6 mins     Hip IR/ER neuro re-ed training         Multifidus isometric with doorway     HEP    Rockerboard    x1 min F/B M/L x1 min rocking each     Dowel linh training     HEP video    Lateral stepping with green TB        Posterior sling    4\"      Stacking with MB 2x30 secs B     Minisquats   NV       Other Therapeutic Activities:      Manual Treatments: Gr 4 LAD L hip followed by MWM hip flexion. L Hip IR/ER neuro re-ed training. STM R QL followed by manual stretching. STM L iliopsoas and iliacus followed by manual stretching. ART L ITB followed by manual stretching and long axis hip IR/ER mobilizations. .            Modalities:      Test/Measurements:    Observation: Standing:  R pelvic rotation. L pes planus. R bunion. L hand dominant but R shoulder depressed compared to L. Mild sway but decreased gluteal and corre activation with standing posture. Body Mechanics: SLS L with increased pelvic rotation R, hip IR, trendelenberg and LOB. R with mild LOB and hip IR. SLS squat L with increased hip IR, knee valgus, trendelenberg, LOB. R with LoB and knee valgus. Squat test: quad dominance, hip IR, K' valgus. AMB: increased L toeing out, decreased B hip extension, ERS lumbar spine. Hip IR B, trendelenberg B.     Spine ROM  Lumbar: Full flexion without pain. B SB decreased 50% with pain on R with R SB. Extension WNL with pain in the R. Rotation limited 50% with pain on R with R rotation. Joint Mobility  ROM RLE: AGMR R hip  ROM LLE: Hypomobility L hip joint, AGMR L hip     Strength RLE  Strength RLE: WNL  Comment: Hip ER 4-/5; Hip IR 4-/5  Strength LLE  Strength LLE: WNL  Comment: Hip ER 4-/5; Hip IR 4-/5  Additional Measures  Flexibility: R hip ER 20 degrees, L hip IR 20 degrees. Special Tests: Prone hip extension test hamstring dominant. (-) clonus, (-) babinski. normal DTR B LEs, normal sensation B LEs. Denies B/B  Other: Hypomobility L Si joint       ASSESSMENT:    Pt continues to present with movement impairments AGMR B hips and ERS lumbar spine.  Pt responding well to therapy with decreased pain but not consistent with HEP to maintain gains.          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

## 2022-02-22 ENCOUNTER — HOSPITAL ENCOUNTER (OUTPATIENT)
Dept: PHYSICAL THERAPY | Age: 67
Setting detail: THERAPIES SERIES
Discharge: HOME | End: 2022-02-22
Payer: MEDICARE

## 2022-02-22 PROCEDURE — 97112 NEUROMUSCULAR REEDUCATION: CPT

## 2022-02-22 PROCEDURE — 97140 MANUAL THERAPY 1/> REGIONS: CPT

## 2022-02-22 PROCEDURE — 97110 THERAPEUTIC EXERCISES: CPT

## 2022-02-22 NOTE — FLOWSHEET NOTE
[facility], [facility], [address], [address]  Phone: [phone]  Fax [phone]    Physical Therapy Daily Treatment Note    Date:  2022    Patient Name:  Allyson Todd    :  1955  MRN: [id number]  Restrictions/Precautions:  L4-5 fusion, retrolisthesis. Osteopeania  Medical/Treatment Diagnosis Information:  · Diagnosis: Hip Pain, Chronic LBP  Insurance/Certification information:  PT Insurance Information: Medicare  Physician Information:  Referring Practitioner: Lelo Maxwell  Plan of care signed (Y/N):  N  Visit# / total visits:      G-Code (if applicable): Modified Oswestry     2/15/22 28% disability    At initial evaluation 28% disability  LEFS   2/15/22 20% disability     At initial evaluation 35% disability     Medicare Cap (if applicable):  2710 = total amount used, updated 2022    Time in:  10:30    Timed Treatment: 45   Total Treatment Time:  45  Time out: 11:15  ________________________________________________________________________________________    Pain Level:   2/10  SUBJECTIVE:  Pt notes the low back has been achy. Lateral hip pain persists especially with sitting in the car. Pt notes consistency with HEP.           OBJECTIVE:     Exercise/Equipment Resistance/Repetitions Other comments   Supine hip flexion neuro re-ed training        TA sets with dowel linh        With march        With KFO          HEP video    Bridging with green TB  10x5 secs   HEP video    Clamshells with green TB   HEP video    Prone hip extension  HEP video    Lumbo pelvic stabilization    Multifidus    3 way ball compression   2x30 secs forward  10x5 secs diagonally     Sciatic flossing    HEP   Standing S' extension    HEP video    DKTC with feet on TB    x30     LTR with feet on TB  x20     Multifidus   HEP video   TG    x6 mins     Hip IR/ER neuro re-ed training         Multifidus isometric with doorway HEP    Rockerboard    x1 min F/B M/L   x1 min rocking each     Dowel linh training     HEP video    Lateral stepping with green TB        Posterior sling    4\"      Stacking with MB 2x30 secs B     Minisquats   NV       Other Therapeutic Activities:      Manual Treatments:   STM R QL followed by manual stretching. .            Modalities:      Test/Measurements:    Observation: Standing:  R pelvic rotation. L pes planus. R bunion. L hand dominant but R shoulder depressed compared to L. Mild sway but decreased gluteal and corre activation with standing posture. Body Mechanics: SLS L with increased pelvic rotation R, hip IR, trendelenberg and LOB. R with mild LOB and hip IR. SLS squat L with increased hip IR, knee valgus, trendelenberg, LOB. R with LoB and knee valgus. Squat test: quad dominance, hip IR, K' valgus. AMB: increased L toeing out, decreased B hip extension, ERS lumbar spine. Hip IR B, trendelenberg B.     Spine ROM  Lumbar: Full flexion without pain. B SB decreased 50% with pain on R with R SB. Extension WNL with pain in the R. Rotation limited 50% with pain on R with R rotation. Joint Mobility  ROM RLE: AGMR R hip  ROM LLE: Hypomobility L hip joint, AGMR L hip     Strength RLE  Strength RLE: WNLye  Comment: Hip ER 4-/5; Hip IR 4-/5  Strength LLE  Strength LLE: WNL  Comment: Hip ER 4-/5; Hip IR 4-/5  Additional Measures  Flexibility: R hip ER 20 degrees, L hip IR 20 degrees. Special Tests: Prone hip extension test hamstring dominant. (-) clonus, (-) babinski. normal DTR B LEs, normal sensation B LEs. Denies B/B  Other: Hypomobility L Si joint       ASSESSMENT:    Pt continues to present with movement impairments AGMR B hips and ERS lumbar spine.           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

## 2022-02-24 ENCOUNTER — HOSPITAL ENCOUNTER (OUTPATIENT)
Dept: PHYSICAL THERAPY | Age: 67
Setting detail: THERAPIES SERIES
Discharge: HOME | End: 2022-02-24
Payer: MEDICARE

## 2022-02-24 PROCEDURE — 97140 MANUAL THERAPY 1/> REGIONS: CPT

## 2022-02-24 PROCEDURE — 97112 NEUROMUSCULAR REEDUCATION: CPT

## 2022-02-24 PROCEDURE — 97110 THERAPEUTIC EXERCISES: CPT

## 2022-02-24 NOTE — FLOWSHEET NOTE
[facility], [facility], [address], [address]  Phone: [phone]  Fax [phone]    Physical Therapy Daily Treatment Note    Date:  2022    Patient Name:  Dionna Gregory    :  1955  MRN: [id number]  Restrictions/Precautions:  L4-5 fusion, retrolisthesis. Osteopeania  Medical/Treatment Diagnosis Information:  · Diagnosis: Hip Pain, Chronic LBP  Insurance/Certification information:  PT Insurance Information: Medicare  Physician Information:  Referring Practitioner: Nury Ricketts  Plan of care signed (Y/N):  N  Visit# / total visits:  12/12 3/8    G-Code (if applicable): Modified Oswestry     2/15/22 28% disability    At initial evaluation 28% disability  LEFS   2/15/22 20% disability     At initial evaluation 35% disability     Medicare Cap (if applicable):  5439 = total amount used, updated 2022    Time in:  10:30    Timed Treatment: 45   Total Treatment Time:  45  Time out: 11:15  ________________________________________________________________________________________    Pain Level:   2/10  SUBJECTIVE:  Pt notes the low back has been achy. Lateral hip pain persists especially with sit to stand out of the car. Pt notes continued consistency with HEP.           OBJECTIVE:     Exercise/Equipment Resistance/Repetitions Other comments   Supine hip flexion neuro re-ed training        TA sets with dowel linh        With march        With KFO          HEP video    Bridging with green TB     HEP video    Clamshells with green TB   HEP video    Prone hip extension  HEP video    Lumbo pelvic stabilization   x3 mins  Multifidus    3 way ball compression   2x30 secs forward  10x5 secs diagonally     Sciatic flossing    HEP   Standing S' extension    HEP video    DKTC with feet on TB    x30     LTR with feet on TB  x20     Multifidus   HEP video   TG    x6 mins     Hip IR/ER neuro re-ed training         Multifidus isometric with doorway   x5 mins   HEP    Rockerboard    x1 min F/B M/L   x1 min rocking each     Dowel linh training     HEP video    Lateral stepping with green TB    12ft x8  x20 B     Posterior sling    4\"      Stacking with MB 2x30 secs B     Hip abd  15# NV    Minisquats   NV       Other Therapeutic Activities:      Manual Treatments:   L Hip IR/ER neuro re-ed training. ART L ITB followed by manual stretching and long axis hip IR/ER mobilizations. .            Modalities:      Test/Measurements:    Observation: Standing:  R pelvic rotation. L pes planus. R bunion. L hand dominant but R shoulder depressed compared to L. Mild sway but decreased gluteal and corre activation with standing posture. Body Mechanics: SLS L with increased pelvic rotation R, hip IR, trendelenberg and LOB. R with mild LOB and hip IR. SLS squat L with increased hip IR, knee valgus, trendelenberg, LOB. R with LoB and knee valgus. Squat test: quad dominance, hip IR, K' valgus. AMB: increased L toeing out, decreased B hip extension, ERS lumbar spine. Hip IR B, trendelenberg B.     Spine ROM  Lumbar: Full flexion without pain. B SB decreased 50% with pain on R with R SB. Extension WNL with pain in the R. Rotation limited 50% with pain on R with R rotation. Joint Mobility  ROM RLE: AGMR R hip  ROM LLE: Hypomobility L hip joint, AGMR L hip     Strength RLE  Strength RLE: WNLye  Comment: Hip ER 4-/5; Hip IR 4-/5  Strength LLE  Strength LLE: WNL  Comment: Hip ER 4-/5; Hip IR 4-/5  Additional Measures  Flexibility: R hip ER 20 degrees, L hip IR 20 degrees. Special Tests: Prone hip extension test hamstring dominant. (-) clonus, (-) babinski. normal DTR B LEs, normal sensation B LEs. Denies B/B  Other: Hypomobility L Si joint       ASSESSMENT:    Pt continues to present with movement impairments AGMR B hips and ERS lumbar spine.           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

## 2022-02-28 ENCOUNTER — HOSPITAL ENCOUNTER (OUTPATIENT)
Dept: PHYSICAL THERAPY | Age: 67
Setting detail: THERAPIES SERIES
Discharge: HOME | End: 2022-02-28
Payer: MEDICARE

## 2022-02-28 PROCEDURE — 97112 NEUROMUSCULAR REEDUCATION: CPT

## 2022-02-28 PROCEDURE — 97140 MANUAL THERAPY 1/> REGIONS: CPT

## 2022-02-28 PROCEDURE — 97110 THERAPEUTIC EXERCISES: CPT

## 2022-02-28 NOTE — FLOWSHEET NOTE
[facility]  [facility] Therapy, [facility], [address], [address]  Phone: [phone]  Fax [phone]    Physical Therapy Daily Treatment Note    Date:  2022    Patient Name:  Florida Santos    :  1955  MRN: [id number]  Restrictions/Precautions:  L4-5 fusion, retrolisthesis. Osteopeania  Medical/Treatment Diagnosis Information:  · Diagnosis: Hip Pain, Chronic LBP  Insurance/Certification information:  PT Insurance Information: Medicare  Physician Information:  Referring Practitioner: Mary Nolan  Plan of care signed (Y/N):  N  Visit# / total visits:      G-Code (if applicable): Modified Oswestry     2/15/22 28% disability    At initial evaluation 28% disability  LEFS   2/15/22 20% disability     At initial evaluation 35% disability     Medicare Cap (if applicable):  4634 = total amount used, updated 2022    Time in:  1:45    Timed Treatment: 45   Total Treatment Time:  45  Time out:   2:30  ________________________________________________________________________________________    Pain Level:   2/10  SUBJECTIVE:  Pt notes feeling really good. Pt states lateral hip pain persists especially with sit to stand out of the car. Pt notes continued consistency with HEP.           OBJECTIVE:     Exercise/Equipment Resistance/Repetitions Other comments   Supine hip flexion neuro re-ed training        TA sets with dowel linh        With march        With KFO          HEP video    Bridging with green TB     HEP video    Clamshells with green TB   HEP video    Prone hip extension  HEP video    Lumbo pelvic stabilization   x3 mins  Multifidus    3 way ball compression   2x30 secs forward  10x5 secs diagonally     Sciatic flossing    HEP   Standing S' extension    HEP video    DKTC with feet on TB    x30     LTR with feet on TB  x20     Multifidus holds with maroon TB  2x20 secs holds  HEP video   TG    x6 mins     Hip IR/ER neuro re-ed training         Multifidus isometric with doorway   x5 mins   HEP    Rockerboard    x1 min F/B M/L   x1 min rocking each     Dowel linh training     HEP video    Lateral stepping with green TB    4x12ft    x10 B steamboats HEP video   Posterior sling    4\" x10 B      Stacking with MB      Hip abd  15# NV    Minisquats   NV       Other Therapeutic Activities:      Manual Treatments: Gr 4 LAD L hip followed by MWM hip flexion. L hip hit technique. L Hip IR/ER neuro re-ed training.  s. .            Modalities:      Test/Measurements:    Observation: Standing:  R pelvic rotation. L pes planus. R bunion. L hand dominant but R shoulder depressed compared to L. Mild sway but decreased gluteal and corre activation with standing posture. Body Mechanics: SLS L with increased pelvic rotation R, hip IR, trendelenberg and LOB. R with mild LOB and hip IR. SLS squat L with increased hip IR, knee valgus, trendelenberg, LOB. R with LoB and knee valgus. Squat test: quad dominance, hip IR, K' valgus. AMB: increased L toeing out, decreased B hip extension, ERS lumbar spine. Hip IR B, trendelenberg B.     Spine ROM  Lumbar: Full flexion without pain. B SB decreased 50% with pain on R with R SB. Extension WNL with pain in the R. Rotation limited 50% with pain on R with R rotation. Joint Mobility  ROM RLE: AGMR R hip  ROM LLE: Hypomobility L hip joint, AGMR L hip     Strength RLE  Strength RLE: WNLye  Comment: Hip ER 4-/5; Hip IR 4-/5  Strength LLE  Strength LLE: WNL  Comment: Hip ER 4-/5; Hip IR 4-/5  Additional Measures  Flexibility: R hip ER 20 degrees, L hip IR 20 degrees. Special Tests: Prone hip extension test hamstring dominant. (-) clonus, (-) babinski. normal DTR B LEs, normal sensation B LEs. Denies B/B  Other: Hypomobility L Si joint       ASSESSMENT:    Pt continues to present with movement impairments AGMR B hips and ERS lumbar spine.   Good overall L hip joint IR/ER motion gains today with manual therapy. Progressed standing core stabilization and multifidus training in standing today.           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

## 2022-03-02 ENCOUNTER — HOSPITAL ENCOUNTER (OUTPATIENT)
Dept: PHYSICAL THERAPY | Age: 67
Setting detail: THERAPIES SERIES
Discharge: HOME | End: 2022-03-02
Payer: MEDICARE

## 2022-03-02 PROCEDURE — 97140 MANUAL THERAPY 1/> REGIONS: CPT

## 2022-03-02 PROCEDURE — 97112 NEUROMUSCULAR REEDUCATION: CPT

## 2022-03-02 PROCEDURE — 97110 THERAPEUTIC EXERCISES: CPT

## 2022-03-02 NOTE — FLOWSHEET NOTE
[facility]  [facility] Therapy, [facility], [address], [address]  Phone: [phone]  Fax [phone]    Physical Therapy Daily Treatment Note    Date:  3/2/2022    Patient Name:  Kar Nguyen    :  1955  MRN: [id number]  Restrictions/Precautions:  L4-5 fusion, retrolisthesis. Osteopeania  Medical/Treatment Diagnosis Information:  · Diagnosis: Hip Pain, Chronic LBP  Insurance/Certification information:  PT Insurance Information: Medicare  Physician Information:  Referring Practitioner: Lang Johnson  Plan of care signed (Y/N):  N  Visit# / total visits:      G-Code (if applicable): Modified Oswestry     2/15/22 28% disability    At initial evaluation 28% disability  LEFS   2/15/22 20% disability     At initial evaluation 35% disability     Medicare Cap (if applicable):  6209 = total amount used, updated 3/2/2022    Time in:  10:00   Timed Treatment: 45   Total Treatment Time:  45  Time out:   10:45  ________________________________________________________________________________________    Pain Level:   2/10  SUBJECTIVE:  Pt notes feeling really good. Pt having injection into bursae on Friday. Pt continues to be consistent with HEP.            OBJECTIVE:     Exercise/Equipment Resistance/Repetitions Other comments   Supine hip flexion neuro re-ed training        TA sets with dowel linh        With march        With KFO          HEP video    Bridging with green TB     HEP video    Clamshells with green TB   HEP video    Prone hip extension  HEP video    Lumbo pelvic stabilization   x3 mins  Multifidus    3 way ball compression        Sciatic flossing    HEP   Standing S' extension    HEP video    DKTC with feet on TB    x30     LTR with feet on TB  x20     Multifidus holds with maroon TB  2x20 secs holds  HEP video   TG    x6 mins     Hip IR/ER neuro re-ed training         Multifidus isometric with doorway   x5 mins   HEP Rockerboard    x1 min F/B M/L   x1 min rocking each     Dowel linh training     HEP video    Lateral stepping with green TB    4x12ft    x10 B steamboats HEP video   Posterior sling    4\" x15 B      Stacking with MB      Hip abd  15# 2x15 B    Minisquats   NV       Other Therapeutic Activities:      Manual Treatments: Gr 4 LAD L hip followed by MWM hip flexion. L hip hit technique. L Hip IR/ER neuro re-ed training. STM R QL followed by manual stretching.s. .            Modalities:      Test/Measurements:    Observation: Standing:  R pelvic rotation. L pes planus. R bunion. L hand dominant but R shoulder depressed compared to L. Mild sway but decreased gluteal and corre activation with standing posture. Body Mechanics: SLS L with increased pelvic rotation R, hip IR, trendelenberg and LOB. R with mild LOB and hip IR. SLS squat L with increased hip IR, knee valgus, trendelenberg, LOB. R with LoB and knee valgus. Squat test: quad dominance, hip IR, K' valgus. AMB: increased L toeing out, decreased B hip extension, ERS lumbar spine. Hip IR B, trendelenberg B.     Spine ROM  Lumbar: Full flexion without pain. B SB decreased 50% with pain on R with R SB. Extension WNL with pain in the R. Rotation limited 50% with pain on R with R rotation. Joint Mobility  ROM RLE: AGMR R hip  ROM LLE: Hypomobility L hip joint, AGMR L hip     Strength RLE  Strength RLE: WNLye  Comment: Hip ER 4-/5; Hip IR 4-/5  Strength LLE  Strength LLE: WNL  Comment: Hip ER 4-/5; Hip IR 4-/5  Additional Measures  Flexibility: R hip ER 20 degrees, L hip IR 20 degrees. Special Tests: Prone hip extension test hamstring dominant. (-) clonus, (-) babinski. normal DTR B LEs, normal sensation B LEs. Denies B/B  Other: Hypomobility L Si joint       ASSESSMENT:    Pt continues to present with movement impairments AGMR B hips and ERS lumbar spine. Good overall L hip joint IR/ER motion gains today with manual therapy.   Progressed standing core stabilization and multifidus training in standing today.           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

## 2022-03-07 ENCOUNTER — APPOINTMENT (OUTPATIENT)
Dept: PHYSICAL THERAPY | Age: 67
End: 2022-03-07
Payer: MEDICARE

## 2022-03-09 ENCOUNTER — HOSPITAL ENCOUNTER (OUTPATIENT)
Dept: PHYSICAL THERAPY | Age: 67
Setting detail: THERAPIES SERIES
Discharge: HOME | End: 2022-03-09
Payer: MEDICARE

## 2022-03-09 PROCEDURE — 97112 NEUROMUSCULAR REEDUCATION: CPT

## 2022-03-09 PROCEDURE — 97110 THERAPEUTIC EXERCISES: CPT

## 2022-03-09 NOTE — FLOWSHEET NOTE
[facility]  [facility] Therapy, [facility], [address], [address]  Phone: [phone]  Fax [phone]    Physical Therapy Daily Treatment Note    Date:  3/9/2022    Patient Name:  Dulce De    :  1955  MRN: [id number]  Restrictions/Precautions:  L4-5 fusion, retrolisthesis. Osteopeania  Medical/Treatment Diagnosis Information:  · Diagnosis: Hip Pain, Chronic LBP  Insurance/Certification information:  PT Insurance Information: Medicare  Physician Information:  Referring Practitioner: Sammy Calhoun  Plan of care signed (Y/N):  N  Visit# / total visits:      G-Code (if applicable): Modified Oswestry     2/15/22 28% disability    At initial evaluation 28% disability  LEFS   2/15/22 20% disability     At initial evaluation 35% disability     Medicare Cap (if applicable):  2616 = total amount used, updated 3/9/2022    Time in:  10:00   Timed Treatment: 45   Total Treatment Time:  45  Time out:   10:45  ________________________________________________________________________________________    Pain Level:   2/10  SUBJECTIVE:  Pt notes feeling really good. Pt reports having injection last week that was good for a day but the following 2 days notes an extremely sharp pain in the hip. Pain has resolved and feeling much better after the injection.             OBJECTIVE:     Exercise/Equipment Resistance/Repetitions Other comments   Supine hip flexion neuro re-ed training        TA sets with dowel linh        With march        With [address] with green TB     HEP video    Clamshells with green TB   HEP video    Prone hip extension  HEP video    Lumbo pelvic stabilization   x3 mins  Multifidus    3 way ball compression        Sciatic flossing    HEP   Standing S' extension    HEP video    DKTC with feet on TB    x30     LTR with feet on TB  x20     Multifidus holds with maroon TB  2x20 secs holds  HEP video TG    x6 mins     Hip IR/ER neuro re-ed training         Multifidus isometric with doorway   x5 mins   HEP    Rockerboard    x1 min F/B M/L   x1 min rocking each     Dowel linh training     HEP video    Lateral stepping with green TB    [id number]    x15 B steamboats HEP video   Posterior sling    4\" x15 B      Stacking with MB      Hip abd  15# 2x15 B    Minisquats   NV       Other Therapeutic Activities:      Manual Treatments:   L Hip IR/ER neuro re-ed training.  s. .            Modalities:      Test/Measurements:    Observation: Standing:  R pelvic rotation. L pes planus. R bunion. L hand dominant but R shoulder depressed compared to L. Mild sway but decreased gluteal and corre activation with standing posture. Body Mechanics: SLS L with increased pelvic rotation R, hip IR, trendelenberg and LOB. R with mild LOB and hip IR. SLS squat L with increased hip IR, knee valgus, trendelenberg, LOB. R with LoB and knee valgus. Squat test: quad dominance, hip IR, K' valgus. AMB: increased L toeing out, decreased B hip extension, ERS lumbar spine. Hip IR B, trendelenberg B.     Spine ROM  Lumbar: Full flexion without pain. B SB decreased 50% with pain on R with R SB. Extension WNL with pain in the R. Rotation limited 50% with pain on R with R rotation. Joint Mobility  ROM RLE: AGMR R hip  ROM LLE: Hypomobility L hip joint, AGMR L hip     Strength RLE  Strength RLE: WNLye  Comment: Hip ER 4-/5; Hip IR 4-/5  Strength LLE  Strength LLE: WNL  Comment: Hip ER 4-/5; Hip IR 4-/5  Additional Measures  Flexibility: R hip ER 20 degrees, L hip IR 20 degrees. Special Tests: Prone hip extension test hamstring dominant. (-) clonus, (-) babinski. normal DTR B LEs, normal sensation B LEs. Denies B/B  Other: Hypomobility L Si joint       ASSESSMENT:    Pt continues to present with movement impairments AGMR B hips and ERS lumbar spine. Good overall L hip joint IR/ER motion gains today with manual therapy.   Progressed standing core stabilization and multifidus training in standing today.           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

## 2022-03-14 ENCOUNTER — HOSPITAL ENCOUNTER (OUTPATIENT)
Dept: PHYSICAL THERAPY | Age: 67
Setting detail: THERAPIES SERIES
Discharge: HOME | End: 2022-03-14
Payer: MEDICARE

## 2022-03-14 PROCEDURE — 97112 NEUROMUSCULAR REEDUCATION: CPT

## 2022-03-14 PROCEDURE — 97110 THERAPEUTIC EXERCISES: CPT

## 2022-03-14 NOTE — FLOWSHEET NOTE
[facility]  [facility]. and Therapy, [facility], [address], [address]  Phone: [phone]  Fax [phone]    Physical Therapy Daily Treatment Note    Date:  3/14/2022    Patient Name:  Katherin Castillo    :  1955  MRN: [id number]  Restrictions/Precautions:  L4-5 fusion, retrolisthesis. Osteopeania  Medical/Treatment Diagnosis Information:  · Diagnosis: Hip Pain, Chronic LBP  Insurance/Certification information:  PT Insurance Information: Medicare  Physician Information:  Referring Practitioner: Grace Bauer  Plan of care signed (Y/N):  N  Visit# / total visits:      G-Code (if applicable): Modified Oswestry     2/15/22 28% disability    At initial evaluation 28% disability  LEFS   2/15/22 20% disability     At initial evaluation 35% disability     Medicare Cap (if applicable):  1234 = total amount used, updated 3/14/2022    Time in:  10:45   Timed Treatment: 45   Total Treatment Time:  45  Time out:   11:30  ________________________________________________________________________________________    Pain Level:   2/10  SUBJECTIVE:  Pt notes feeling really good and the back pain 'held up well' at a dog show this weekend. Pt notes 'I felt stronger' doing the dog show this weekend.              OBJECTIVE:     Exercise/Equipment Resistance/Repetitions Other comments   Nustep  x6 mins     TA sets with dowel linh        With march        With KFO          HEP video    Bridging with green TB     HEP video    Clamshells with green TB   HEP video    Prone hip extension  HEP video    Lumbo pelvic stabilization   x3 mins  Multifidus    3 way ball compression        Sciatic flossing    HEP   Standing S' extension    HEP video    DKTC with feet on TB        Rotational multifidus with step out x10 B maroon TB     LTR with feet on TB      Multifidus holds with maroon TB  2x20 secs holds  HEP video   TG    x6 mins     Hip IR/ER neuro re-ed training Multifidus isometric with doorway   x5 mins   HEP    Rockerboard    x1 min F/B M/L   x1 min rocking each     Lunge with glider x10 B     Dowel linh training     HEP video    Lateral stepping with green TB     HEP video   Posterior sling    4\" x15 B maroon TB     Lateral sling x15 B maroon TB     Stacking with MB      Hip abd  15# 2x15 B  45# x15 B     Minisquats   NV       Other Therapeutic Activities:      Manual Treatments: s. .            Modalities:      Test/Measurements:    Observation: Standing:  R pelvic rotation. L pes planus. R bunion. L hand dominant but R shoulder depressed compared to L. Mild sway but decreased gluteal and corre activation with standing posture. Body Mechanics: SLS L with increased pelvic rotation R, hip IR, trendelenberg and LOB. R with mild LOB and hip IR. SLS squat L with increased hip IR, knee valgus, trendelenberg, LOB. R with LoB and knee valgus. Squat test: quad dominance, hip IR, K' valgus. AMB: increased L toeing out, decreased B hip extension, ERS lumbar spine. Hip IR B, trendelenberg B.     Spine ROM  Lumbar: Full flexion without pain. B SB decreased 50% with pain on R with R SB. Extension WNL with pain in the R. Rotation limited 50% with pain on R with R rotation. Joint Mobility  ROM RLE: AGMR R hip  ROM LLE: Hypomobility L hip joint, AGMR L hip     Strength RLE  Strength RLE: WNLye  Comment: Hip ER 4-/5; Hip IR 4-/5  Strength LLE  Strength LLE: WNL  Comment: Hip ER 4-/5; Hip IR 4-/5  Additional Measures  Flexibility: R hip ER 20 degrees, L hip IR 20 degrees. Special Tests: Prone hip extension test hamstring dominant. (-) clonus, (-) babinski. normal DTR B LEs, normal sensation B LEs. Denies B/B  Other: Hypomobility L Si joint       ASSESSMENT:    Pt continues to present with movement impairments AGMR B hips and ERS lumbar spine. Progressed standing core stabilization and multifidus training in standing today.           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

## 2022-03-16 ENCOUNTER — HOSPITAL ENCOUNTER (OUTPATIENT)
Dept: PHYSICAL THERAPY | Age: 67
Setting detail: THERAPIES SERIES
Discharge: HOME | End: 2022-03-16
Payer: MEDICARE

## 2022-03-16 PROCEDURE — 97140 MANUAL THERAPY 1/> REGIONS: CPT

## 2022-03-16 PROCEDURE — 97112 NEUROMUSCULAR REEDUCATION: CPT

## 2022-03-16 PROCEDURE — 97110 THERAPEUTIC EXERCISES: CPT

## 2022-03-16 NOTE — PROGRESS NOTES
[facility], [facility], [address]  [place], [address]  Phone: [phone]  Fax [phone]    Physical Therapy Daily Treatment Note    Date:  3/16/2022    Patient Name:  Benjamín Hahn    :  1955  MRN: [id number]  Restrictions/Precautions:  L4-5 fusion, retrolisthesis. Osteopeania  Medical/Treatment Diagnosis Information:  · Diagnosis: Hip Pain, Chronic LBP  Insurance/Certification information:  PT Insurance Information: Medicare  Physician Information:  Referring Practitioner: [facility]  Plan of care signed (Y/N):  N  Visit# / total visits:  20    G-Code (if applicable): Modified Oswestry     3/16/22 10% disability    2/15/22 28% disability    At initial evaluation 28% disability  LEFS   3/16/22 15% disability     2/15/22 20% disability     At initial evaluation 35% disability     Medicare Cap (if applicable):  9843 = total amount used, updated 3/16/2022  (520 used in  and added to this figure through last visit. Updated to this new total today)    Time in:  10:00   Timed Treatment: 45   Total Treatment Time:  45  Time out:   10:45  ________________________________________________________________________________________    Pain Level:  1-2/10  SUBJECTIVE:  Pt had injections yesterday for lumbar pain. Mild soreness at injection site, sore to the touch. LBP has been good overall with therapy and pt states she is consistent with HEP. OBJECTIVE:     Test/Measurements:    Spine ROM  Lumbar: Full flexion without pain. B SB decreased 25% with pain on R with R SB. Extension WNL with pain in the R. Rotation limited 25% with pain on R with R rotation.   ROM RLE: AGMR R hip  ROM LLE: Hypomobility L hip joint, AGMR L hip     Strength RLE  Strength RLE: WNL  Comment: Hip ER 4/5; Hip IR 4/5  Strength LLE  Strength LLE: WNL  Comment: Hip ER 4/5; Hip IR 4/5  Additional Measures  Flexibility: R hip ER 20 degrees, L hip IR 20 degrees. Special Tests: Prone hip extension test hamstring dominant. (-) clonus, (-) babinski. normal DTR B LEs, normal sensation B LEs. Denies B/B       ASSESSMENT:    Pt continues to present with movement impairments AGMR B hips and ERS lumbar spine. Therapy has progressed to standing core stabilization and multifidus training with functional tasks. Overall, good improvement in condition. Pt c/o neck pain is now more than LBP and hip pain. Transition to cervical diagnosis for evaluation NV and plan going forward. Would recommend keeping this prescription and episode of care open PRN for the next 4 weeks to treat flare ups.              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

## 2022-03-16 NOTE — FLOWSHEET NOTE
[facility]  [facility] Therapy, [facility], [address], [address]  Phone: [phone]  Fax [phone]    Physical Therapy Daily Treatment Note    Date:  3/16/2022    Patient Name:  Manny Julian    :  1955  MRN: [id number]  Restrictions/Precautions:  L4-5 fusion, retrolisthesis. Osteopeania  Medical/Treatment Diagnosis Information:  · Diagnosis: Hip Pain, Chronic LBP  Insurance/Certification information:  PT Insurance Information: Medicare  Physician Information:  Referring Practitioner: Moises Naranjo  Plan of care signed (Y/N):  N  Visit# / total visits:  20    G-Code (if applicable): Modified Oswestry     3/16/22 10% disability    2/15/22 28% disability    At initial evaluation 28% disability  LEFS   3/16/22 15% disability     2/15/22 20% disability     At initial evaluation 35% disability     Medicare Cap (if applicable):  1290 = total amount used, updated 3/16/2022  (520 used in  and added to this figure through last visit. Updated to this new total today)    Time in:  10:00   Timed Treatment: 45   Total Treatment Time:  45  Time out:   10:45  ________________________________________________________________________________________    Pain Level:  1-2/10  SUBJECTIVE:  Pt had injections yesterday for lumbar pain. Mild soreness at injection site, sore to the touch. LBP has been good overall with therapy and pt states she is consistent with HEP.                OBJECTIVE:     Exercise/Equipment Resistance/Repetitions Other comments   Nustep  x6 mins     TA sets with dowel linh        With march        With KFO          HEP video    Bridging with green TB     HEP video    Clamshells with green TB   HEP video    Prone hip extension  HEP video    Lumbo pelvic stabilization   x3 mins  Multifidus    3 way ball compression        Sciatic flossing    HEP   Standing S' extension    HEP video    DKTC with feet on TB        Rotational multifidus with step out x10 B maroon TB     LTR with feet on TB      Multifidus holds with maroon TB  2x20 secs holds  HEP video   TG    x6 mins     Hip IR/ER neuro re-ed training         Multifidus isometric with doorway   x5 mins   HEP    Rockerboard        Lunge with glider x10 B     Dowel linh training     HEP video    Lateral stepping with green TB     HEP video   Posterior sling    4\"    Lateral sling x15 B maroon TB     Stacking with MB      Hip abd  30# 2x15 B  60# x15 B     Minisquats   NV       Other Therapeutic Activities:      Manual Treatments:           Modalities:      Test/Measurements:    Spine ROM  Lumbar: Full flexion without pain. B SB decreased 25% with pain on R with R SB. Extension WNL with pain in the R. Rotation limited 25% with pain on R with R rotation. ROM RLE: AGMR R hip  ROM LLE: Hypomobility L hip joint, AGMR L hip     Strength RLE  Strength RLE: WNL  Comment: Hip ER 4/5; Hip IR 4/5  Strength LLE  Strength LLE: WNL  Comment: Hip ER 4/5; Hip IR 4/5  Additional Measures  Flexibility: R hip ER 20 degrees, L hip IR 20 degrees. Special Tests: Prone hip extension test hamstring dominant. (-) clonus, (-) babinski. normal DTR B LEs, normal sensation B LEs. Denies B/B       ASSESSMENT:    Pt continues to present with movement impairments AGMR B hips and ERS lumbar spine. Therapy has progressed to standing core stabilization and multifidus training with functional tasks. Overall, good improvement in condition. Pt c/o neck pain is now more than LBP and hip pain. Transition to cervical diagnosis for evaluation NV and plan going forward. Would recommend keeping this prescription and episode of care open PRN for the next 4 weeks to treat flare ups.              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

## 2022-03-16 NOTE — PROGRESS NOTES
Outpatient Physical Therapy  Phone: [phone] Fax: [phone]     To: Gunnar Lara      From: Enrique Munoz PT   Patient: Radha Stanford       : 1955  Diagnosis: Hip Pain, Chronic LBP    Date: 3/16/2022  Treatment Diagnosis:      Physical Therapy Progress/Discharge Note    Total Visits to date:   21  Cancels/No-shows to date:      Plan of Care/Treatment to date:  [x] Therapeutic Exercise    [] Modalities:  [x] Therapeutic Activity     [] Ultrasound   [] Electrical Stimulation   [x] Gait Training      [] Cervical Traction   [] Lumbar Traction  [x] Neuromuscular Re-education  [] Cold/hotpack  [] Iontophoresis  [x] Instruction in HEP      Other:  [x] Manual Therapy       []                                 [] Aquatic Therapy       []                                     Progress towards goals:  See progress note      Frequency/Duration: PRN for 4 weeks  # Days per week: [] 1 day # Weeks: [] 1 week [] 4 weeks      [] 2 days   [] 2 weeks [] 5 weeks     [] 3 days   [] 3 weeks [] 6 weeks     Rehab Potential: [] Excellent [x] Good [x] Fair  [] Poor     Goal Status:  [] Achieved [x] Partially Achieved  [] Not Achieved     Patient Status: [] Continue per initial plan of Care     [] Patient now discharged     [x] Additional visits requested, Please re-certify for additional visits:      Requested frequency/duration:  PRN for 4 weeks as we transition to cervical diagnosis and treatment    Electronically signed by:  Enrique Munoz PT     If you have any questions or concerns, please don't hesitate to call.   Thank you for your referral.    Physician Signature:________________________________Date:__________________  By signing above, therapists plan is approved by physician

## 2022-03-21 ENCOUNTER — HOSPITAL ENCOUNTER (OUTPATIENT)
Dept: PHYSICAL THERAPY | Age: 67
Setting detail: THERAPIES SERIES
Discharge: HOME | End: 2022-03-21
Payer: MEDICARE

## 2022-03-21 PROCEDURE — 97161 PT EVAL LOW COMPLEX 20 MIN: CPT

## 2022-03-21 PROCEDURE — 97140 MANUAL THERAPY 1/> REGIONS: CPT

## 2022-03-21 ASSESSMENT — PAIN SCALES - GENERAL: PAINLEVEL_OUTOF10: 3

## 2022-03-21 NOTE — PROGRESS NOTES
Outpatient Physical Therapy  Phone: [phone] Fax: [phone]     To: Jm Albina     From: Sulaiman Mancini PT     Patient: Cesar Rico      : 1955  Diagnosis: Cervical Pain    Date: 3/21/2022  Treatment Diagnosis:      Physical Therapy Certification/Re-Certification Form  Dear Dr. Octavio Schwartz,   The following patient has been evaluated for physical therapy services and for therapy to continue, Medicare requires monthly physician review of the treatment plan. Please review the attached evaluation and/or summary of the patient's plan of care, and verify that you agree therapy should continue by signing the attached document and sending it back to our office. Plan of Care/Treatment to date:  [x] Therapeutic Exercise   [] Modalities:  [x] Therapeutic Activity     [] Ultrasound  [] Electrical Stimulation   [] Gait Training      [] Cervical Traction [] Lumbar Traction  [x] Neuromuscular Re-education  [] Hot/Coldpack [] Iontophoresis    [x] Instruction in HEP      Other:  [x] Manual Therapy       []                        [] Aquatic Therapy       []                      ? Frequency/Duration:  # Days per week: [] 1 day # Weeks: [] 1 week [] 5 weeks      [x] 2 days? [] 2 weeks [x] 6 weeks     [] 3 days   [] 3 weeks [] 7 weeks     [] 4 days   [] 4 weeks [] 8 weeks    Rehab Potential: [] Excellent [x] Good [] Fair  [] Poor       Electronically signed by:  Sulaiman Mancini PT      If you have any questions or concerns, please don't hesitate to call.   Thank you for your referral.    Physician Signature:________________________________Date:__________________  By signing above, therapists plan is approved by physician
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
## 2022-03-21 NOTE — FLOWSHEET NOTE
[facility]  [facility] and Therapy, [facility], [address] [facility], [address] [place] [address] Dr  Phone: [phone]  Fax [phone]    Physical Therapy Daily Treatment Note    Date:  3/21/2022    Patient Name:  Mira Ireland    :  1955  MRN: [id number]  Restrictions/Precautions:  Martita Nunez  Medical/Treatment Diagnosis Information:  · Diagnosis: Neck Pain  Insurance/Certification information:  PT Insurance Information: Medicare  Physician Information:  Referring Practitioner: Beth Rodriguez  Plan of care signed (Y/N):  Y  Visit# / total visits:      G-Code (if applicable):          NDI 20% disability     Medicare Cap (if applicable):  8073 = total amount used, updated 3/21/2022  (416 3978 used in previous episode)    Time in:   10:45      Timed Treatment: 15 Total Treatment Time:  45  Time out: 11:30  ________________________________________________________________________________________    Pain Level:    /10  SUBJECTIVE:  Pt reports neck pain hx 2-3 years that has progressively worsened. Increases with flexion of the head to read, knit, etc.  Increased pain with turning the head to the R especially with driving. Sleeping is a 'problem' especially on her side. Pt is a side sleeper. Pt able to sleep through the night but sometimes will wake with a major HA. No increased pain with sitting or standing. 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 No issues with walking. Knitting and reading limited to 45 mins due to pain. HA frequency 1x/wk which is more than normal and intensity is very high as well. Pt has to rest when she gets a HA. Feels like a migraine. Pt reports no nausea, slight dizziness, no blurred or double vision, no slurred speech or difficulty speaking. No radicular symptoms noted. Does have arthritis in B hands and pain in B hands. L shoulder stiffness noted.   Slight numbness in all finger tips B constantly. Pt did have R surgery to repair fractured wrist.  Fx in the L wrist healed with a cast.  HA pain in the base of the posterior skull. OBJECTIVE:  (-) cranial nerve and VA testing.  (-) upper cervical ligament testing, (-) clonus and hoyos testing  X-ray (+) DDD C5-6, C6-7, mild spondylolithesis at C4-5 emerson and C5-6 retro    Exercise/Equipment Resistance/Repetitions Other comments          Cervical rotation    NV     SNF strengthening  NV            Pivot prone  NV     UT on wall 3rd visit    LT on wall  3rd visit           Foam roll protocol NV                                                                                Other Therapeutic Activities:      Manual Treatments:  Suboccipital release x5 mins, gr 3-4 gapping R cervical C1-3. Thoracic gapping gr 4-5 without cavitation. L gr 3-4 mobs to ribs 1-2 without cavitation. NV STM B cervical paraspinals/UT followed by FMP B.  Gr 3-4 C0-1. MET to improve R rotation C1-2, C2-3. Thoracic PAs to TPs upper thoracic and CTJ. CTJ gr 3-4 mobilization. B GH joint mobs posteriorly, scapular mobs and HH repositioning followed by IR/ER neuro re-ed. B lat stretching and pec stretching. Modalities:      Test/Measurements:         ASSESSMENT:         Treatment/Activity Tolerance:   [x]Patient tolerated treatment well [] Patient limited by fatique  []Patient limited by pain [] Patient limited by other medical complications  [] Other:     Goals:        Long term goals  Time Frame for Long term goals : 6 weeks  Long term goal 1: Pt independent with HEP  Long term goal 2: SNF strength improve by 1/3 muscle grade  Long term goal 3: Full pain-free cervical ROM  Long term goal 4: Return to PLOF. Long term goal 5: Pt shoulder and scapular strength improve by 1/3 muscle grade.      Plan: [x] Continue per plan of care [] Alter current plan (see comments)   [x] Plan of care initiated [] Hold pending MD visit [] Discharge      Plan for Next Session: Re-Certification Due Date:         Signature:  Enrique Munoz, PT

## 2022-03-23 ENCOUNTER — HOSPITAL ENCOUNTER (OUTPATIENT)
Dept: PHYSICAL THERAPY | Age: 67
Setting detail: THERAPIES SERIES
Discharge: HOME | End: 2022-03-23
Payer: MEDICARE

## 2022-03-23 PROCEDURE — 97140 MANUAL THERAPY 1/> REGIONS: CPT

## 2022-03-23 PROCEDURE — 97110 THERAPEUTIC EXERCISES: CPT

## 2022-03-23 PROCEDURE — 97112 NEUROMUSCULAR REEDUCATION: CPT

## 2022-03-23 NOTE — FLOWSHEET NOTE
[facility]  [facility]. and Therapy, [facility], [address] [facility], [address]  Phone: [phone]  Fax [phone]    Physical Therapy Daily Treatment Note    Date:  3/23/2022    Patient Name:  Susan Li    :  1955  MRN: [id number]  Restrictions/Precautions:  Toma Bradford  Medical/Treatment Diagnosis Information:  · Diagnosis: Neck Pain  Insurance/Certification information:  PT Insurance Information: Medicare  Physician Information:  Referring Practitioner: Lynnette Batres  Plan of care signed (Y/N):  Y  Visit# / total visits:      G-Code (if applicable):          NDI 20% disability     Medicare Cap (if applicable):  7418 = total amount used, updated 3/23/2022  (416 3978 used in previous episode)    Time in:   10:00      Timed Treatment: 45 Total Treatment Time:  45  Time out: 10:45  ________________________________________________________________________________________    Pain Level:    3/10  SUBJECTIVE:  Mild improvement from initial visit treatment. Initial evaluation:  Pt reports neck pain hx 2-3 years that has progressively worsened. Increases with flexion of the head to read, knit, etc.  Increased pain with turning the head to the R especially with driving. Sleeping is a 'problem' especially on her side. Pt is a side sleeper. Pt able to sleep through the night but sometimes will wake with a major HA. No increased pain with sitting or standing. 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 No issues with walking. Knitting and reading limited to 45 mins due to pain. HA frequency 1x/wk which is more than normal and intensity is very high as well. Pt has to rest when she gets a HA. Feels like a migraine. Pt reports no nausea, slight dizziness, no blurred or double vision, no slurred speech or difficulty speaking. No radicular symptoms noted. Does have arthritis in B hands and pain in B hands.   L shoulder and scapular strength improve by 1/3 muscle grade.      Plan: [x] Continue per plan of care [] Alter current plan (see comments)   [x] Plan of care initiated [] Hold pending MD visit [] Discharge      Plan for Next Session:      Re-Certification Due Date:         Signature:  Maggie Verdugo PT

## 2022-03-28 ENCOUNTER — HOSPITAL ENCOUNTER (OUTPATIENT)
Dept: PHYSICAL THERAPY | Age: 67
Setting detail: THERAPIES SERIES
Discharge: HOME | End: 2022-03-28
Payer: MEDICARE

## 2022-03-28 PROCEDURE — 97140 MANUAL THERAPY 1/> REGIONS: CPT

## 2022-03-28 PROCEDURE — 97112 NEUROMUSCULAR REEDUCATION: CPT

## 2022-03-28 PROCEDURE — 97110 THERAPEUTIC EXERCISES: CPT

## 2022-03-28 NOTE — FLOWSHEET NOTE
[facility], [facility], [address] [facility], [address] [place] [address]  Phone: [phone]  Fax [phone]    Physical Therapy Daily Treatment Note    Date:  3/28/2022    Patient Name:  Hao Reese    :  1955  MRN: [id number]  Restrictions/Precautions:  Kayleigh Venegas  Medical/Treatment Diagnosis Information:  · Diagnosis: Neck Pain  Insurance/Certification information:  PT Insurance Information: Medicare  Physician Information:  Referring Practitioner: Marvin Burrell  Plan of care signed (Y/N):  Y  Visit# / total visits: 3/12     G-Code (if applicable):          NDI 20% disability     Medicare Cap (if applicable):  0196 = total amount used, updated 3/28/2022  (416 3978 used in previous episode)    Time in:   10:45      Timed Treatment: 45 Total Treatment Time:  45  Time out: 11:30  ________________________________________________________________________________________    Pain Level:    2-3/10  SUBJECTIVE:  Pt reports the cold weather is making her 'more stiff'. No HA since last session. Initial evaluation:  Pt reports neck pain hx 2-3 years that has progressively worsened. Increases with flexion of the head to read, knit, etc.  Increased pain with turning the head to the R especially with driving. Sleeping is a 'problem' especially on her side. Pt is a side sleeper. Pt able to sleep through the night but sometimes will wake with a major HA. No increased pain with sitting or standing. 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 No issues with walking. Knitting and reading limited to 45 mins due to pain. HA frequency 1x/wk which is more than normal and intensity is very high as well. Pt has to rest when she gets a HA. Feels like a migraine. Pt reports no nausea, slight dizziness, no blurred or double vision, no slurred speech or difficulty speaking. No radicular symptoms noted.   Does have arthritis in B hands and pain in B hands. L shoulder stiffness noted. Slight numbness in all finger tips B constantly. Pt did have R surgery to repair fractured wrist.  Fx in the L wrist healed with a cast.  HA pain in the base of the posterior skull. OBJECTIVE:  (-) cranial nerve and VA testing.  (-) upper cervical ligament testing, (-) clonus and hoyos testing  X-ray (+) DDD C5-6, C6-7, mild spondylolithesis at C4-5 emerson and C5-6 retro    Exercise/Equipment Resistance/Repetitions Other comments          Cervical rotation    x10 B HEP video   SNF strengthening  10x5 secs B  HEP video           Pivot prone  2x30 secs B   x10 B  HEP video    UT on wall 3rd visit    LT on wall  3rd visit           Foam roll protocol x8 mins  HEP video             S' IR/ER neuro re-ed     1# x6 mins                                                              Other Therapeutic Activities:      Manual Treatments:  Suboccipital release x5 mins, gr 3-4 gapping R cervical C1-3. Thoracic gapping gr 4-5 without cavitation. L gr 3-4 mobs to ribs 1-2 without cavitation. STM B cervical paraspinals/UT followed by FMP B.  Gr 3-4 C0-1. MET to improve L rotation C6-7. Thoracic PAs to TPs upper thoracic and CTJ. CTJ gr 3-4 mobilization. B GH joint mobs posteriorly, scapular mobs and HH repositioning followed by IR/ER neuro re-ed. B lat stretching and pec stretching. Modalities:      Test/Measurements:         ASSESSMENT:    Pt responded well to manual therapy with improved cervical and B S' joint mobility. Initiated strengthening and neuro re-ed training.       Treatment/Activity Tolerance:   [x]Patient tolerated treatment well [] Patient limited by fatique  []Patient limited by pain [] Patient limited by other medical complications  [] Other:     Goals:        Long term goals  Time Frame for Long term goals : 6 weeks  Long term goal 1: Pt independent with HEP (met)  Long term goal 2: SNF strength improve by 1/3 muscle grade  Long term goal 3: Full pain-free cervical ROM  Long term goal 4: Return to PLOF  Long term goal 5: Pt shoulder and scapular strength improve by 1/3 muscle grade    Plan: [x] Continue per plan of care [] Alter current plan (see comments)   [] Plan of care initiated [] Hold pending MD visit [] Discharge      Plan for Next Session:      Re-Certification Due Date:         Signature:  Cheryl Del Cid PT

## 2022-03-30 ENCOUNTER — HOSPITAL ENCOUNTER (OUTPATIENT)
Dept: PHYSICAL THERAPY | Age: 67
Setting detail: THERAPIES SERIES
Discharge: HOME | End: 2022-03-30
Payer: MEDICARE

## 2022-03-30 PROCEDURE — 97110 THERAPEUTIC EXERCISES: CPT

## 2022-03-30 PROCEDURE — 97140 MANUAL THERAPY 1/> REGIONS: CPT

## 2022-03-30 PROCEDURE — 97112 NEUROMUSCULAR REEDUCATION: CPT

## 2022-03-30 NOTE — FLOWSHEET NOTE
[facility], [facility] [address], [address]   Phone: [phone]  Fax [phone]    Physical Therapy Daily Treatment Note    Date:  3/30/2022    Patient Name:  Lilly Toth    :  1955  MRN: [id number]  Restrictions/Precautions:  Sagrario Mace  Medical/Treatment Diagnosis Information:  · Diagnosis: Neck Pain  Insurance/Certification information:  PT Insurance Information: Medicare  Physician Information:  Referring Practitioner: Sofia Mtz  Plan of care signed (Y/N):  Y  Visit# / total visits:      G-Code (if applicable):          NDI 20% disability     Medicare Cap (if applicable):  5566 = total amount used, updated 3/30/2022  (416 3978 used in previous episode)    Time in:   10:08      Timed Treatment: 45 Total Treatment Time:  45  Time out: 11:00  ________________________________________________________________________________________    Pain Level:    2-3/10  SUBJECTIVE:  Pt reports feeling pretty good. Mild HA and L neck pain this morning possibly due to sleeping 'funny'. Initial evaluation:  Pt reports neck pain hx 2-3 years that has progressively worsened. Increases with flexion of the head to read, knit, etc.  Increased pain with turning the head to the R especially with driving. Sleeping is a 'problem' especially on her side. Pt is a side sleeper. Pt able to sleep through the night but sometimes will wake with a major HA. No increased pain with sitting or standing. 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 No issues with walking. Knitting and reading limited to 45 mins due to pain. HA frequency 1x/wk which is more than normal and intensity is very high as well. Pt has to rest when she gets a HA. Feels like a migraine. Pt reports no nausea, slight dizziness, no blurred or double vision, no slurred speech or difficulty speaking.   No radicular symptoms noted.  Does have arthritis in B hands and pain in B hands. L shoulder stiffness noted. Slight numbness in all finger tips B constantly. Pt did have R surgery to repair fractured wrist.  Fx in the L wrist healed with a cast.  HA pain in the base of the posterior skull. OBJECTIVE:  (-) cranial nerve and VA testing.  (-) upper cervical ligament testing, (-) clonus and hoyos testing  X-ray (+) DDD C5-6, C6-7, mild spondylolithesis at C4-5 emerson and C5-6 retro    Exercise/Equipment Resistance/Repetitions Other comments          Cervical rotation    x10 B HEP video   SNF strengthening  10x5 secs B  HEP video           Pivot prone  2x30 secs B   x10 B  HEP video    UT on wall x10 B    LT on wall  x10 B           Foam roll protocol x8 mins  HEP video             S' IR/ER neuro re-ed     1# x6 mins                                                              Other Therapeutic Activities:      Manual Treatments:  Suboccipital release x5 mins, gr 3-4 gapping R cervical C1-3. Thoracic gapping gr 4-5 without cavitation. L gr 3-4 mobs to ribs 1-2 without cavitation. STM B cervical paraspinals/UT followed by FMP B.  Gr 3-4 C0-1. MET to improve L rotation C6-7. Thoracic PAs to TPs upper thoracic and CTJ. CTJ gr 3-4 mobilization. B GH joint mobs posteriorly, scapular mobs and HH repositioning followed by IR/ER neuro re-ed. B lat stretching and pec stretching. Modalities:      Test/Measurements:         ASSESSMENT:    Pt responded well to manual therapy with improved cervical and B S' joint mobility. Initiated strengthening and neuro re-ed training.       Treatment/Activity Tolerance:   [x]Patient tolerated treatment well [] Patient limited by fatique  []Patient limited by pain [] Patient limited by other medical complications  [] Other:     Goals:        Long term goals  Time Frame for Long term goals : 6 weeks  Long term goal 1: Pt independent with HEP (met)  Long term goal 2: SNF strength improve by 1/3 muscle grade  Long term goal 3: Full pain-free cervical ROM  Long term goal 4: Return to PLOF  Long term goal 5: Pt shoulder and scapular strength improve by 1/3 muscle grade    Plan: [x] Continue per plan of care [] Alter current plan (see comments)   [] Plan of care initiated [] Hold pending MD visit [] Discharge      Plan for Next Session:      Re-Certification Due Date:         Signature:  Naima Vazquez PT

## 2022-04-06 ENCOUNTER — APPOINTMENT (OUTPATIENT)
Dept: PHYSICAL THERAPY | Age: 67
End: 2022-04-06
Payer: MEDICARE

## 2022-04-12 ENCOUNTER — APPOINTMENT (OUTPATIENT)
Dept: PHYSICAL THERAPY | Age: 67
End: 2022-04-12
Payer: MEDICARE

## 2022-04-13 ENCOUNTER — HOSPITAL ENCOUNTER (OUTPATIENT)
Dept: PHYSICAL THERAPY | Age: 67
Setting detail: THERAPIES SERIES
Discharge: HOME | End: 2022-04-13
Payer: MEDICARE

## 2022-04-13 NOTE — PROGRESS NOTES
Physical Therapy  Cancellation/No-show Note  Patient Name:  Jaret Harley  :  1955   Date:  2022  Cancels to date: 1  No-shows to date: 0    For today's appointment patient:  [x] Cancelled  [] Rescheduled appointment  [] No-show     Reason given by patient:  [] Patient ill  [] Conflicting appointment  [x] No transportation    [] Conflict with work  [] No reason given  [] Other:     Comments:      Electronically signed by:  Hansel Fernandez PT

## 2022-04-21 ENCOUNTER — HOSPITAL ENCOUNTER (OUTPATIENT)
Dept: PHYSICAL THERAPY | Age: 67
Setting detail: THERAPIES SERIES
Discharge: HOME | End: 2022-04-21
Payer: MEDICARE

## 2022-04-21 NOTE — PROGRESS NOTES
Physical Therapy  Cancellation/No-show Note  Patient Name:  Talisha Banegas  :  1955   Date:  2022  Cancels to date: 2  No-shows to date: 0    For today's appointment patient:  [x] Cancelled  [] Rescheduled appointment  [] No-show     Reason given by patient:  [] Patient ill  [] Conflicting appointment  [] No transportation    [] Conflict with work  [] No reason given  [] Other:     Comments:      Electronically signed by:  Gwendolyn Leija PT

## 2022-04-22 ENCOUNTER — TELEPHONE (OUTPATIENT)
Dept: ORTHOPEDIC SURGERY | Age: 67
End: 2022-04-22

## 2022-04-22 NOTE — TELEPHONE ENCOUNTER
Appointment Request     Patient requesting earlier appointment: Yes  Appointment offered to patient:   Patient Contact Number: [phone]    Or can you make a referral for someone else who does injections.

## 2022-04-25 NOTE — TELEPHONE ENCOUNTER
Left message for patient told her Dr. Aleksander Prather will be leaving for paternity leave and will not be back until July she would like to see another physician

## 2022-04-29 ENCOUNTER — HOSPITAL ENCOUNTER (OUTPATIENT)
Dept: PHYSICAL THERAPY | Age: 67
Setting detail: THERAPIES SERIES
Discharge: HOME | End: 2022-04-29
Payer: MEDICARE

## 2022-04-29 PROCEDURE — 97140 MANUAL THERAPY 1/> REGIONS: CPT

## 2022-04-29 PROCEDURE — 97110 THERAPEUTIC EXERCISES: CPT

## 2022-04-29 PROCEDURE — 97112 NEUROMUSCULAR REEDUCATION: CPT

## 2022-04-29 NOTE — FLOWSHEET NOTE
[facility], [address]  Phone: [phone]  Fax [phone]    Physical Therapy Daily Treatment Note    Date:  2022    Patient Name:  Ileana Rodriguez    :  1955  MRN: [id number]  Restrictions/Precautions:  Denzel Bisimat  Medical/Treatment Diagnosis Information:  · Diagnosis: Neck Pain  Insurance/Certification information:  PT Insurance Information: Medicare  Physician Information:  Referring Practitioner: Aga Liao  Plan of care signed (Y/N):  Y  Visit# / total visits:      G-Code (if applicable):          NDI 20% disability     Medicare Cap (if applicable):  4251 = total amount used, updated 2022  (1170 used in previous episode)    Time in:   9:15     Timed Treatment: 45 Total Treatment Time:  45  Time out: 10:00  ________________________________________________________________________________________    Pain Level:    2-3/10  SUBJECTIVE:  Pt reports LBP is main complaint but the neck is painful but not as bad as it was initially. 4 weeks since last therapy session. Initial evaluation:  Pt reports neck pain hx 2-3 years that has progressively worsened. Increases with flexion of the head to read, knit, etc.  Increased pain with turning the head to the R especially with driving. Sleeping is a 'problem' especially on her side. Pt is a side sleeper. Pt able to sleep through the night but sometimes will wake with a major HA. No increased pain with sitting or standing. 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 No issues with walking. Knitting and reading limited to 45 mins due to pain. HA frequency 1x/wk which is more than normal and intensity is very high as well. Pt has to rest when she gets a HA. Feels like a migraine. Pt reports no nausea, slight dizziness, no blurred or double vision, no slurred speech or difficulty speaking. No radicular symptoms noted. Does have arthritis in B hands and pain in B hands. L shoulder stiffness noted. Slight numbness in all finger tips B constantly. Pt did have R surgery to repair fractured wrist.  Fx in the L wrist healed with a cast.  HA pain in the base of the posterior skull. OBJECTIVE:  (-) cranial nerve and VA testing.  (-) upper cervical ligament testing, (-) clonus and hoyos testing  X-ray (+) DDD C5-6, C6-7, mild spondylolithesis at C4-5 emerson and C5-6 retro    Exercise/Equipment Resistance/Repetitions Other comments          Cervical rotation    x10 B HEP video   SNF strengthening  10x5 secs B  HEP video           Pivot prone  2x30 secs B   x10 B  HEP video    UT on wall x10 B    LT on wall  x10 B           Foam roll protocol x8 mins  HEP video             S' IR/ER neuro re-ed     1# x6 mins                                                              Other Therapeutic Activities:      Manual Treatments:  Manual therapy performed by PT Oli . Suboccipital release x5 mins, gr 3-4 gapping R cervical C1-3. Thoracic gapping gr 4-5 without cavitation. L gr 3-4 mobs to ribs 1-2 without cavitation. STM B cervical paraspinals/UT followed by FMP B.  Gr 3-4 C0-1. MET to improve L rotation C6-7. Thoracic PAs to TPs upper thoracic and CTJ. CTJ gr 3-4 mobilization. B GH joint mobs posteriorly, scapular mobs and HH repositioning followed by IR/ER neuro re-ed. B lat stretching and pec stretching. Modalities:      Test/Measurements:         ASSESSMENT:    Pt responded well to manual therapy with improved cervical and B S' joint mobility. Continued strengthening and neuro re-ed training.       Treatment/Activity Tolerance:   [x]Patient tolerated treatment well [] Patient limited by fatique  []Patient limited by pain [] Patient limited by other medical complications  [] Other:     Goals:        Long term goals  Time Frame for Long term goals : 6 weeks  Long term goal 1: Pt independent with HEP (met)  Long term goal 2: SNF strength improve by 1/3 muscle grade  Long term goal 3: Full pain-free cervical ROM  Long term goal 4: Return to PLOF  Long term goal 5: Pt shoulder and scapular strength improve by 1/3 muscle grade    Plan: [x] Continue per plan of care [] Alter current plan (see comments)   [] Plan of care initiated [] Hold pending MD visit [] Discharge      Plan for Next Session:      Re-Certification Due Date:         Signature:  Jackie Norwood PT

## 2022-06-03 NOTE — FLOWSHEET NOTE
Wt Readings from Last 3 Encounters:   06/04/22 86 8 kg (191 lb 5 8 oz)   06/01/22 84 7 kg (186 lb 11 7 oz)   05/24/22 83 9 kg (185 lb)     10/06/2020 EF of 52% grade 2 diastolic dysfunction mild stenosis of the aorta     Plan:    · Repeat  Echo:  EF of 55% with grade 2 diastolic dysfunction with mild to moderate stenosis of the aortic valve, with moderate to severe regurgitation of the tricuspid valve & Training (92698)                Other:                  Therapeutic Exercise & NMR:  [] (86373) Provided verbal/tactile cueing for activities related to strengthening, flexibility, endurance, ROM  for improvements in scapular, scapulothoracic and UE control with self care, reaching, carrying, lifting, house/yardwork, driving/computer work.    [] (55641) Provided verbal/tactile cueing for activities related to improving balance, coordination, kinesthetic sense, posture, motor skill, proprioception  to assist with  scapular, scapulothoracic and UE control with self care, reaching, carrying, lifting, house/yardwork, driving/computer work.     Therapeutic Activities & NMR:    [] (20350 or 02401) Provided verbal/tactile cueing for activities related to improving balance, coordination, kinesthetic sense, posture, motor skill, proprioception and motor activation to allow for proper function of scapular, scapulothoracic and UE control with self care, carrying, lifting, driving/computer work    Home Exercise Program:    [] (04961) Reviewed/Progressed HEP activities related to strengthening, flexibility, endurance, ROM of scapular, scapulothoracic and UE control with self care, reaching, carrying, lifting, house/yardwork, driving/computer work  [] (48766) Reviewed/Progressed HEP activities related to improving balance, coordination, kinesthetic sense, posture, motor skill, proprioception of scapular, scapulothoracic and UE control with self care, reaching, carrying, lifting, house/yardwork, driving/computer work      Manual Treatments:  PROM / STM / Oscillations-Mobs:  G-I, II, III, IV (PA's, Inf., Post.)  [] (47989) Provided manual therapy to mobilize soft tissue/joints of cervical/CT, scapular GHJ and UE for the purpose of modulating pain, promoting relaxation,  increasing ROM, reducing/eliminating soft tissue swelling/inflammation/restriction, improving soft tissue extensibility and allowing for proper ROM for normal function with self care, reaching, carrying, lifting, house/yardwork, driving/computer work    ADL Training:  [] (89167) Provided self-care/home management training related to activities of daily living and compensatory training, and/or use of adaptive equipment      Charges:  Timed Code Treatment Minutes: 54'   Total Treatment Minutes: 72'   Worker's Comp: Time In/Time Out     [] EVAL (LOW) 30919 (typically 20 minutes face-to-face)    [] EVAL (MOD) 25874 (typically 30 minutes face-to-face)  [] EVAL (HIGH) 88773 (typically 45 minutes face-to-face)  [] OT Re-eval (89143)       [x] Alona [phone]) x 2     [] PVIER(66627)  [x] NMR (80452) x  1    [] Estim (attended) (96353)   [x] Manual ([phone]) x   1   [] US (86737)  [] TA () x      [] Paraffin (77352)  [] ADL  ([phone]) x     [] Splint/L code:  custom wrist/hand static spint   [] Estim (unattended) ([phone])  [] Fluidotherapy (73307)  [] Other:    Comorbidities Affecting Functional Performance:     []Anxiety (F41.9)/Depression (F32.9)   []Diabetes Type 1(E10.65) or 2 (E11.65)   []Rheumatoid Arthritis (M05.9)  []Fibromyalgia (M79.7)  []Neuropathy(G60.9)  []Osteoarthritis(M19.91)  []None   []Other:    ASSESSMENT:  Pt making good progress with AROM  GOALS:  Patient stated goal: To have full function of both UEs   []? Progressing: []? Met: []? Not Met: []? Adjusted     Therapist goals for Patient:   Short Term Goals: To be achieved in: 2 weeks  1. Independent in HEP and progression per patient tolerance, in order to prevent re-injury. []? Progressing: []? Met: []? Not Met: []? Adjusted   2. Patient will have a decrease in pain to facilitate improvement in movement, function, and ADLs as indicated by Functional Deficits. []? Progressing: []? Met: []? Not Met: []?  Adjusted     Long Term Goals to be achieved in 12 weeks (through 8/19/21), including patient directed goals to address patient identified performance deficits:  1) Pt to be independent in graded HEP progression with a good level of effort and compliance. []? Progressing: []? Met: []? Not Met: []? Adjusted   2) Pt to report a score of </= 30 % on the Quick DASH disability questionnaire for increased performance with carrying, moving, and handling objects. []? Progressing: []? Met: []? Not Met: []? Adjusted   3) Pt will demonstrate increased ROM to [facility] for improved independence with self care, home mgt, leisure activities, driving and sleeping.  []? Progressing: []? Met: []? Not Met: []? Adjusted   4) Pt will demonstrate increased strength to at least 25# of  strength in both hands for improved independence with  self care, home mgt, leisure activities, driving and sleeping.  []? Progressing: []? Met: []? Not Met: []? Adjusted   5) Pt will have a decrease in pain to 2/10 to facilitate  self care, home mgt, leisure activities, driving and sleeping.  []? Progressing: []? Met: []? Not Met: []? Adjusted    Overall Progression Towards Functional Goals/Treatment Progress Update:  [] Patient is progressing as expected towards functional goals listed. [] Progression is slowed due to complexities/impairments listed. [] Progression has been slowed due to co-morbidities.   [x] Plan just implemented, too soon to assess goals progression <30 days  [] Goals require adjustment due to lack of progress  [] Patient is not progressing as expected and requires additional follow up with physician  [] All goals are met  [] Other:     Prognosis for POC: [x] Good [] Fair  [] Poor    Patient requires continued skilled intervention: [x] Yes  [] No    Treatment/Activity Tolerance:  [x] Patient able to complete treatment  [] Patient limited by fatigue  [] Patient limited by pain    [] Patient limited by other medical complications  [] Other:                  PLAN: See eval  [] Continue per plan of care [] Alter current plan (see comments above)  [x] Plan of care initiated [] Hold pending MD visit [] Discharge      Electronically signed by:  Candyce Mcardle, OT/L [id number]    Note: If patient does not return for scheduled/ recommended follow up visits, this note will serve as a discharge from care along with most recent update on progress.   Phone: [phone]  Fax [phone]

## 2024-02-20 NOTE — PROGRESS NOTES
4 Eyes Skin Assessment Completed by MARTINE Lowry and MARTINE Rios.    Head WDL  Ears  L laceration with sutures, bruising and abrasions  Nose WDL  Mouth WDL  Neck WDL  Breast/Chest WDL  Shoulder Blades WDL  Spine WDL  (R) Arm/Elbow/Hand Bruising, Abrasion, Scab, and Discoloration  (L) Arm/Elbow/Hand Redness, Blanching, Bruising, Abrasion, Scab, and Discoloration  Abdomen Bruising  Groin WDL  Scrotum/Coccyx/Buttocks WDL  Sacrum  Redness and blanching    (R) Leg Bruising on hip    (L) Leg Bruising on hip    (R) Heel/Foot/Toe Redness, Blanching, and Boggy, bruising     (L) Heel/Foot/Toe Redness, Blanching, and Boggy      Devices In Places Blood Pressure Cuff, Pulse Ox, SCD's, and Nasal Cannula      Interventions In Place Gray Ear Foams, Heel Mepilex, TAP System, Pillows, Q2 Turns, Low Air Loss Mattress, Heels Loaded W/Pillows, and Pressure Redistribution Mattress    Possible Skin Injury No    Pictures Uploaded Into Epic N/A  Wound Consult Placed N/A  RN Wound Prevention Protocol Ordered Yes     CHIEF COMPLAINT:    Chief Complaint   Patient presents with    Knee Pain     LEFT KNEE PAIN. PREV. PT OF DR. RUST. SEEN LAST YEAR FOR EUFLEXXA INJ       HISTORY OF PRESENT ILLNESS:    Pleasant lady used to be a patient of Dr. Sabrina Scott. She has had 2 ACL reconstructions by Dr. Delmi Winter and multiple bilateral knee surgeries over the years. She most recently had bilateral Euflexxa done in March 2019 and did very well with this. She is interested in potentially pursuing another round. The patient is a 71 y.o. 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     The pain assessment was noted & is as follows:  Pain Assessment  Location of Pain: Knee  Location Modifiers: Left  Severity of Pain: 4  Quality of Pain: Sharp, Dull, Aching  Duration of Pain: A few hours  Frequency of Pain: Several times daily  Aggravating Factors: Stairs, Walking, Stretching, Bending  Limiting Behavior: Some  Relieving Factors: Rest  Result of Injury: No  Work-Related Injury: No  Are there other pain locations you wish to document?: No]      Work Status/Functionality:     Past Medical History: Medical history form was reviewed today & can be found in the media tab  Past Medical History:   Diagnosis Date    Carpal tunnel syndrome 10/20/2014    Diverticulitis     Primary localized osteoarthrosis, hand 10/20/2014    Spinal stenosis       Past Surgical History:     Past Surgical History:   Procedure Laterality Date    CHOLECYSTECTOMY      FOOT SURGERY      KNEE ARTHROSCOPY      KNEE SURGERY       Current Medications:     Current Outpatient Medications:     diclofenac (PENNSAID) 2 % SOLN, Place 2 Pump onto the skin 2 times daily, Disp: 1 Bottle, Rfl: 3    Efinaconazole 10 % SOLN, Apply 2 Squirts topically 2 times daily, Disp: 1 Bottle, Rfl: 0    meloxicam (MOBIC) 15 MG tablet, Take 1 tablet by mouth daily, Disp: 30 tablet, Rfl: 3    PROZAC 40 MG capsule, , Disp: , Rfl: 0    levothyroxine (SYNTHROID) 88 MCG tablet, Take 50 mcg by mouth Daily , Disp: , Rfl:     FLUoxetine (PROZAC) 10 MG capsule, Take 60 mg by mouth daily , Disp: , Rfl:     estrogen, conjugated,-medroxyprogesterone (PREMPRO) 0.3-1.5 MG per tablet, Take 1 tablet by mouth daily. , Disp: , Rfl:     atorvastatin (LIPITOR) 10 MG tablet, Take 10 mg by mouth daily. , Disp: , Rfl:     vitamin D (ERGOCALCIFEROL) 91015 UNITS CAPS capsule, , Disp: , Rfl:     escitalopram (LEXAPRO) 20 MG tablet, , Disp: , Rfl:     SYNTHROID 112 MCG tablet, , Disp: , Rfl:   Allergies:  Levaquin  [levofloxacin] and Sulfa antibiotics  Social History:    reports that she has never smoked. She has never used smokeless tobacco.  Family History:   Family History   Problem Relation Age of Onset    Anesth Problems Neg Hx     Broken Bones Neg Hx     Cancer Neg Hx     Clotting Disorder Neg Hx     Collagen Disease Neg Hx     Diabetes Neg Hx     Dislocations Neg Hx     Osteoporosis Neg Hx     Rheumatologic Disease Neg Hx     Scoliosis Neg Hx     Severe Sprains Neg Hx        REVIEW OF SYSTEMS:   For new problems, a full review of systems will be found scanned in the patient's chart. CONSTITUTIONAL: Denies unexplained weight loss, fevers, chills   NEUROLOGICAL: Denies unsteady gait or progressive weakness  SKIN: Denies skin changes, delayed healing, rash, itching       PHYSICAL EXAM:    Vitals: Height 5' 5.75\" (1.67 m), weight 175 lb (79.4 kg). GENERAL EXAM:  · General Apparence: Patient is adequately groomed with no evidence of malnutrition. · Orientation: The patient is oriented to time, place and person.    · Mood & Affect:The patient's mood and affect are appropriate       Lateral knees PHYSICAL EXAMINATION:  · Inspection: Healed surgical incision on the left ear consistent with ACL surgery. The right demonstrates arthroscopy portals as well as the LEFT. There is a 1+ effusion bilateral with no obvious rotational angular deformity. · Palpation: Minimal tenderness along the medial joint space on the Lefton even less so on the right. · Range of Motion: Maintained range of motion bilateral.    · Strength: 5/5 bilateral.    · Special Tests: ACL is intact bilateral.  MCL LCL and PCL are all intact bilateral.  Negative Pedro Pablo bilateral.          · Skin:  There are no rashes, ulcerations or lesions. · There are no bilateral distal dysvascular changes     Gait & station: Normal      Additional Examinations:        Hip range of motion bilateral.  Negative straight leg raise examination bilateral.  Negative logroll examination bilateral.      Diagnostic Testing: The following x rays were read and interpreted by myself      1. X-ray views of LEFI demonstrates moderate tricompartmental osteoarthritic change and bone scar consistent with a remote ACL reconstruction. Orders     Orders Placed This Encounter   Procedures    XR KNEE LEFT (3 VIEWS)     Standing Status:   Future     Number of Occurrences:   1     Standing Expiration Date:   10/26/2021     Order Specific Question:   Reason for exam:     Answer:   PAIN         Assessment / Treatment Plan:     1. Known bilateral postsurgical osteoarthritis of the knees. This lady is done extremely well with Euflexxa in the past we will get a go ahead and get her started on a course today. She is not symptomatic enough    2. The patient is symptomatic from osteoarthritis of the left and right knee joint with documented radiological signs of arthritis. The patient has also failed 3 months of conservative treatment including home exercise, education, Tylenol and/or NSAIDs use. The patient was offered a Visco supplementation today. Risks, benefits, and alternatives to the injections were discussed in detail with the patient.  The risks discussed included but are not limited to infection, skin reactions, hot swollen joints, and anaphylaxis. The patient gave verbal informed consent for the injection. The patient's skin was prepped with  3 sterile gauze  pads soaked with alcohol solution and the knee joint was injected with 2cc Eeflexa intra-articularly under sterile conditions. Technique: Under sterile conditions a [employer] ultrasound unit with a variable frequency (6.0-15.0 MHz) linear transducer was used to localize the placement of a 22-gauge needle into the Lefton and right knee joint. Findings: Successful needle placement for intra-articular Visco supplementation injection. Final images were taken and saved for permanent record. The patient tolerated the injection reasonably well. The patient was given instructions to ice the kne and avoid strenuous activities for 24-48 hours. The patient was instructed to call the office immediately if there is increased pain, redness, warmth, fever, or chills. We will see the patient back in one week for their second injection. 3.  Next week for second bilateral knee injection    I have personally performed and/or participated in the history, exam and medical decision making and agree with all pertinent clinical information. I have also reviewed and agree with the past medical, family and social history unless otherwise noted. This dictation was performed with a verbal recognition program (DRAGON) and it was checked for errors. It is possible that there are still dictated errors within this office note. If so, please bring any errors to my attention for an addendum. All efforts were made to ensure that this office note is accurate.           Trecia Canavan, MD